# Patient Record
Sex: MALE
[De-identification: names, ages, dates, MRNs, and addresses within clinical notes are randomized per-mention and may not be internally consistent; named-entity substitution may affect disease eponyms.]

---

## 2020-11-11 DIAGNOSIS — Z01.818 ENCOUNTER FOR OTHER PREPROCEDURAL EXAMINATION: ICD-10-CM

## 2020-11-11 PROBLEM — Z00.00 ENCOUNTER FOR PREVENTIVE HEALTH EXAMINATION: Status: ACTIVE | Noted: 2020-11-11

## 2020-11-12 ENCOUNTER — APPOINTMENT (OUTPATIENT)
Dept: DISASTER EMERGENCY | Facility: CLINIC | Age: 70
End: 2020-11-12

## 2020-11-16 ENCOUNTER — OUTPATIENT (OUTPATIENT)
Dept: OUTPATIENT SERVICES | Facility: HOSPITAL | Age: 70
LOS: 1 days | Discharge: HOME | End: 2020-11-16

## 2022-12-02 ENCOUNTER — INPATIENT (INPATIENT)
Facility: HOSPITAL | Age: 72
LOS: 12 days | Discharge: HOME | DRG: 949 | End: 2022-12-15
Attending: PHYSICAL MEDICINE & REHABILITATION | Admitting: PHYSICAL MEDICINE & REHABILITATION
Payer: MEDICARE

## 2022-12-02 VITALS
WEIGHT: 181 LBS | DIASTOLIC BLOOD PRESSURE: 71 MMHG | RESPIRATION RATE: 15 BRPM | TEMPERATURE: 98 F | HEART RATE: 91 BPM | HEIGHT: 66 IN | SYSTOLIC BLOOD PRESSURE: 128 MMHG | OXYGEN SATURATION: 97 %

## 2022-12-02 DIAGNOSIS — Z98.890 OTHER SPECIFIED POSTPROCEDURAL STATES: Chronic | ICD-10-CM

## 2022-12-02 DIAGNOSIS — Z96.653 PRESENCE OF ARTIFICIAL KNEE JOINT, BILATERAL: ICD-10-CM

## 2022-12-02 RX ORDER — SENNA PLUS 8.6 MG/1
2 TABLET ORAL AT BEDTIME
Refills: 0 | Status: DISCONTINUED | OUTPATIENT
Start: 2022-12-02 | End: 2022-12-15

## 2022-12-02 RX ORDER — ASPIRIN/CALCIUM CARB/MAGNESIUM 324 MG
81 TABLET ORAL EVERY 12 HOURS
Refills: 0 | Status: DISCONTINUED | OUTPATIENT
Start: 2022-12-02 | End: 2022-12-15

## 2022-12-02 RX ORDER — ACETAMINOPHEN 500 MG
975 TABLET ORAL EVERY 8 HOURS
Refills: 0 | Status: DISCONTINUED | OUTPATIENT
Start: 2022-12-02 | End: 2022-12-15

## 2022-12-02 RX ORDER — FERROUS SULFATE 325(65) MG
325 TABLET ORAL
Refills: 0 | Status: DISCONTINUED | OUTPATIENT
Start: 2022-12-02 | End: 2022-12-05

## 2022-12-02 RX ORDER — ATORVASTATIN CALCIUM 80 MG/1
40 TABLET, FILM COATED ORAL AT BEDTIME
Refills: 0 | Status: DISCONTINUED | OUTPATIENT
Start: 2022-12-02 | End: 2022-12-15

## 2022-12-02 RX ORDER — BNT162B2 ORIGINAL AND OMICRON BA.4/BA.5 .1125; .1125 MG/2.25ML; MG/2.25ML
0.3 INJECTION, SUSPENSION INTRAMUSCULAR ONCE
Refills: 0 | Status: DISCONTINUED | OUTPATIENT
Start: 2022-12-02 | End: 2022-12-03

## 2022-12-02 RX ORDER — OXYCODONE HYDROCHLORIDE 5 MG/1
10 TABLET ORAL EVERY 4 HOURS
Refills: 0 | Status: DISCONTINUED | OUTPATIENT
Start: 2022-12-02 | End: 2022-12-08

## 2022-12-02 RX ORDER — FAMOTIDINE 10 MG/ML
20 INJECTION INTRAVENOUS
Refills: 0 | Status: DISCONTINUED | OUTPATIENT
Start: 2022-12-02 | End: 2022-12-15

## 2022-12-02 RX ORDER — LOSARTAN POTASSIUM 100 MG/1
25 TABLET, FILM COATED ORAL DAILY
Refills: 0 | Status: DISCONTINUED | OUTPATIENT
Start: 2022-12-03 | End: 2022-12-05

## 2022-12-02 RX ORDER — OXYCODONE HYDROCHLORIDE 5 MG/1
5 TABLET ORAL EVERY 4 HOURS
Refills: 0 | Status: DISCONTINUED | OUTPATIENT
Start: 2022-12-02 | End: 2022-12-08

## 2022-12-02 RX ADMIN — Medication 975 MILLIGRAM(S): at 23:20

## 2022-12-02 RX ADMIN — Medication 81 MILLIGRAM(S): at 22:50

## 2022-12-02 RX ADMIN — FAMOTIDINE 20 MILLIGRAM(S): 10 INJECTION INTRAVENOUS at 22:50

## 2022-12-02 RX ADMIN — Medication 325 MILLIGRAM(S): at 22:50

## 2022-12-02 RX ADMIN — ATORVASTATIN CALCIUM 40 MILLIGRAM(S): 80 TABLET, FILM COATED ORAL at 22:50

## 2022-12-02 RX ADMIN — SENNA PLUS 2 TABLET(S): 8.6 TABLET ORAL at 22:51

## 2022-12-02 RX ADMIN — Medication 975 MILLIGRAM(S): at 22:50

## 2022-12-02 NOTE — H&P ADULT - NSHPREVIEWOFSYSTEMS_GEN_ALL_CORE
REVIEW OF SYSTEMS  Constitutional: No fever, No Chills, No fatigue  HEENT: No eye pain, No visual disturbances, No difficulty hearing  Pulm: No cough,  No shortness of breath  Cardio: No chest pain, No palpitations  GI:  No abdominal pain, No nausea, No vomiting, No diarrhea, No constipation  : No dysuria, No frequency, No hematuria  Neuro: No headaches, No memory loss, No loss of strength, No numbness, No tremors  Skin: No itching, No rashes, No lesions   MSK: +joint pain, + joint swelling, No muscle pain, No Neck or back pain  Psych:  No depression, No anxiety

## 2022-12-02 NOTE — H&P ADULT - HISTORY OF PRESENT ILLNESS
This is a 72 year old male patient with hx of HTN, HLD, Rotator Cuff Tear (s/p Bilateral Repairs: Left 05/29/2013; Right: 08/28/2019), DDD (s/p Lumbar Laminectomy 1986) and Osteoarthritis presented to [facility] 11/30 for bilateral total knee replacement. Patient notes over 3 year history of bilateral knee pains. Of note, he states that he's had previous meniscus surgeries on both knees. The pain is described as "dull" and rates it 8/10 in intensity, and worse with stairs and inclines as well as with prolonged standing and ambulation. Conservative care tried in the past included intake of NSAIDs, cortisone injections, rest and activity modification with minimal relief of pain. XR of bilateral knees demonstrated "bone on bone" articulation. Patient underwent bilateral TKA 11/30/22 with. Patient deemed appropriate for acute inpatient rehab. Cleared for d/c to [facility] acute inpatient rehab on 12/2/22.

## 2022-12-02 NOTE — PATIENT PROFILE ADULT - FALL HARM RISK - HARM RISK INTERVENTIONS
Assistance with ambulation/Assistance OOB with selected safe patient handling equipment/Communicate Risk of Fall with Harm to all staff/Discuss with provider need for PT consult/Monitor gait and stability/Provide patient with walking aids - walker, cane, crutches/Reinforce activity limits and safety measures with patient and family/Sit up slowly, dangle for a short time, stand at bedside before walking/Tailored Fall Risk Interventions/Use of alarms - bed, chair and/or voice tab/Visual Cue: Yellow wristband and red socks/Bed in lowest position, wheels locked, appropriate side rails in place/Call bell, personal items and telephone in reach/Instruct patient to call for assistance before getting out of bed or chair/Non-slip footwear when patient is out of bed/[place] to call system/Physically safe environment - no spills, clutter or unnecessary equipment/Purposeful Proactive Rounding/Room/bathroom lighting operational, light cord in reach

## 2022-12-02 NOTE — H&P ADULT - NSHPLABSRESULTS_GEN_ALL_CORE
12/01/2022    Glucose: 127    Sodium: 137    Potassium: 4.1    Blood Urea Nitrogen : 15    Creatinine: 1   Hematology:    12/02/2022    WBC: 9.49    HgB: 9.9    Hct: 28.8    Platelets: 160   Cultures:     11/30/2022 COVID-19 PCR; Not detected   Radiology:     X-ray right knee 11/30: Postsurgical changes consistent with recent knee arthroplasty with appropriate alignment.

## 2022-12-02 NOTE — H&P ADULT - NSHPSOCIALHISTORY_GEN_ALL_CORE
SOCIAL HISTORY  Smoking -   EtOH -   Marital Status -     FUNCTIONAL HISTORY    Previous Functional Status:  Independent in ambulation, ADL's, transfers prior to hospitalization    Current Functional Status:  Bed mobility:  Transfers:  Gait / ambulation:  ADL's:  Speech: SOCIAL HISTORY  Smoking -   EtOH -   Marital Status -     FUNCTIONAL HISTORY  Patient lives with spouse in an [address] guido with rails tub/shower with curtain    Previous Functional Status:Independent with ambulation, ADLs, and transfers no AD   Independent in ambulation, ADL's, transfers prior to hospitalization    Current Functional Status:  12/1   rolling Altaf  sit/supine Altaf   bed chair modA  amb 40' RW Altaf   LBD modA  toileting Altaf SOCIAL HISTORY  Smoking - denies   EtOH -  denies  Marital Status -     FUNCTIONAL HISTORY  Patient lives with spouse in a 2nd floor apartment apt 16 steps to 2nd floor.  0 SYLVIE    Previous Functional Status: Independent with ambulation, ADLs, and transfers no AD   Independent in ambulation, ADL's, transfers prior to hospitalization    Current Functional Status:  12/1   rolling Altaf  sit/supine Altaf   bed chair modA  amb 40' RW Altaf   LBD modA  toileting Altaf

## 2022-12-02 NOTE — H&P ADULT - NSICDXPASTSURGICALHX_GEN_ALL_CORE_FT
PAST SURGICAL HISTORY:  H/O meniscectomy of right knee     H/O repair of rotator cuff left 2013, right 2019    History of lumbar laminectomy 1986    History of meniscectomy of left knee

## 2022-12-02 NOTE — H&P ADULT - ASSESSMENT
72yM with a history of HTN, HLD, OA, who presented to [facility] on 11/30 with complaint of bilateral knee pain and OA who underwent bilateral total knee arthroplasty on 11/30/22. Hospital course supposedly uncomplicated.        Admitted to [place] acute inpatient rehab on 12/2 for ADL, gait, and functional impairments.       #Functional deficits due to OA s/p Bilateral Total Knee Arthroplasty  -b/l TKA 11/30/22    - Comprehensive Multidisciplinary Rehab Program     3 hours a day, 5 days a week with PT/OT/SLP     P&O as needed    Pain Management:  - Tylenol PRN    GI/Bowel:  - Senna QHS, Miralax daily PRN  - GI ppx:    /Bladder:   - Encourage timed voids every 4 hours while awake       Skin/Pressure Injury:  - Skin assessment on admission admission: ***  - Monitor Incisions: ***  - Turn every 2 hours while in bed    Diet:   - Diet Consistency/Modifications: ***  - Aspiration Precautions  - SLP consult for swallow function evaluation and treatment    DVT ppx:  - ***  - SCDs  - Last Doppler on ***    Restrictions/Precautions:  - Weight bearing status: ****  - ROM restrictions: ***  - Precautions:    ---------------  Outpatient Follow-up:      --------------    Goals: Safe discharge to home  Estimated Length of Stay: 10-14 days  Rehab Potential: Good  Medical Prognosis: Good  Estimated Disposition: Home with Home Care  ---------------    PRESCREEN COMPARISON:  I have reviewed the prescreen information and I have found no relevant changes between the preadmission screening and my post admission evaluation.    RATIONALE FOR INPATIENT ADMISSION: Patient demonstrates the following:  [X] Medically appropriate for rehabilitation admission  [X] Has attainable rehab goals with an appropriate initial discharge plan  [X]Has rehabilitation potential (expected to make a significant improvement within a reasonable period of time)  [X] Requires close medical management by a rehab physician, rehab nursing care, Hospitalist and comprehensive interdisciplinary team (including PT, OT and/or SLP, Prosthetics and Orthotics)   72yM with a history of HTN, HLD, OA, who presented to [facility] on 11/30 with complaint of bilateral knee pain and OA who underwent bilateral total knee arthroplasty on 11/30/22. Hospital course supposedly uncomplicated.        Admitted to [place] acute inpatient rehab on 12/2 for ADL, gait, and functional impairments.       #Functional deficits due to OA s/p Bilateral Total Knee Arthroplasty  -b/l TKA 11/30/22  -pain control  -DVT ppx  - Comprehensive Multidisciplinary Rehab Program     3 hours a day, 5 days a week with PT/OT/SLP     P&O as needed    #HTN  c/w losartan    #HLD  c/w statin    Pain Management:  - Tylenol PRN    GI/Bowel:  - Senna QHS, Miralax daily   - GI ppx: pepcid    /Bladder:   - Encourage timed voids every 4 hours while awake       Skin/Pressure Injury:  - Skin assessment on admission: ***  - Monitor Incisions: ***      Diet:   - Diet Consistency/Modifications: Reg w/ thins - DASH    DVT ppx:  - ***  - Last Doppler unknown    Restrictions/Precautions:  - Weight bearing status: WBAT (confirm on d/c paperwork)  - ROM restrictions:   - Precautions: Falls    ---------------  Outpatient Follow-up:      --------------    Goals: Safe discharge to home  Estimated Length of Stay: 10-14 days  Rehab Potential: Good  Medical Prognosis: Good  Estimated Disposition: Home with Home Care  ---------------    PRESCREEN COMPARISON:  I have reviewed the prescreen information and I have found no relevant changes between the preadmission screening and my post admission evaluation.    RATIONALE FOR INPATIENT ADMISSION: Patient demonstrates the following:  [X] Medically appropriate for rehabilitation admission  [X] Has attainable rehab goals with an appropriate initial discharge plan  [X]Has rehabilitation potential (expected to make a significant improvement within a reasonable period of time)  [X] Requires close medical management by a rehab physician, rehab nursing care, Hospitalist and comprehensive interdisciplinary team (including PT, OT and/or SLP, Prosthetics and Orthotics)   72yM with a history of HTN, HLD, OA, who presented to [facility] on 11/30 with complaint of bilateral knee pain and OA who underwent bilateral total knee arthroplasty on 11/30/22. Hospital course supposedly uncomplicated.        Admitted to [place] acute inpatient rehab on 12/2 for ADL, gait, and functional impairments.       #Functional deficits due to OA s/p Bilateral Total Knee Arthroplasty  -b/l TKA 11/30/22  -pain control  -DVT ppx with ASA BID   - Comprehensive Multidisciplinary Rehab Program     3 hours a day, 5 days a week with PT/OT/SLP     P&O as needed    #HTN  c/w losartan    #HLD  c/w statin    Pain Management:  - Tylenol PRN    GI/Bowel:  - Senna QHS, Miralax daily   - GI ppx: pepcid    /Bladder:   - Encourage timed voids every 4 hours while awake       Skin/Pressure Injury:  - Skin assessment on admission: ***  - Monitor Incisions: ***      Diet:   - Diet Consistency/Modifications: Reg w/ thins - DASH    DVT ppx:  - ***  - Last Doppler unknown    Restrictions/Precautions:  - Weight bearing status: WBAT (confirm on d/c paperwork)  - ROM restrictions:   - Precautions: Falls    ---------------  Outpatient Follow-up:      --------------    Goals: Safe discharge to home  Estimated Length of Stay: 10-14 days  Rehab Potential: Good  Medical Prognosis: Good  Estimated Disposition: Home with Home Care  ---------------    PRESCREEN COMPARISON:  I have reviewed the prescreen information and I have found no relevant changes between the preadmission screening and my post admission evaluation.    RATIONALE FOR INPATIENT ADMISSION: Patient demonstrates the following:  [X] Medically appropriate for rehabilitation admission  [X] Has attainable rehab goals with an appropriate initial discharge plan  [X]Has rehabilitation potential (expected to make a significant improvement within a reasonable period of time)  [X] Requires close medical management by a rehab physician, rehab nursing care, Hospitalist and comprehensive interdisciplinary team (including PT, OT and/or SLP, Prosthetics and Orthotics)   72yM with a history of HTN, HLD, OA, who presented to [facility] on 11/30 with complaint of bilateral knee pain and OA who underwent bilateral total knee arthroplasty on 11/30/22. Hospital course supposedly uncomplicated.        Admitted to [place] acute inpatient rehab on 12/2 for ADL, gait, and functional impairments.       #Functional deficits due to OA s/p Bilateral Total Knee Arthroplasty  -b/l TKA 11/30/22  -pain control  -DVT ppx with ASA BID for 6 weeks (till 1/11)  - Comprehensive Multidisciplinary Rehab Program     3 hours a day, 5 days a week with PT/OT/SLP     P&O as needed    #HTN  c/w home dose losartan    #HLD  c/w equivalent home dose statin (at home on on Crestor 10mg)    Pain Management:  - Tylenol PRN  -oxycodone PRN    GI/Bowel:  - Senna QHS, Miralax daily   - GI ppx: pepcid    /Bladder:   - Encourage timed voids every 4 hours while awake       Skin/Pressure Injury:  - Skin assessment on admission: intact  - Monitor Incisions: Bilateral knee surgical incision with aquacel to be removed 7 days       Diet:   - Diet Consistency/Modifications: Reg w/ thins - DASH    DVT ppx:  - ***  - Last Doppler unknown    Restrictions/Precautions:  - Weight bearing status: WBAT (confirm on d/c paperwork)  - ROM restrictions:   - Precautions: Falls    ---------------  Outpatient Follow-up:      --------------    Goals: Safe discharge to home  Estimated Length of Stay: 10-14 days  Rehab Potential: Good  Medical Prognosis: Good  Estimated Disposition: Home with Home Care  ---------------    PRESCREEN COMPARISON:  I have reviewed the prescreen information and I have found no relevant changes between the preadmission screening and my post admission evaluation.    RATIONALE FOR INPATIENT ADMISSION: Patient demonstrates the following:  [X] Medically appropriate for rehabilitation admission  [X] Has attainable rehab goals with an appropriate initial discharge plan  [X]Has rehabilitation potential (expected to make a significant improvement within a reasonable period of time)  [X] Requires close medical management by a rehab physician, rehab nursing care, Hospitalist and comprehensive interdisciplinary team (including PT, OT and/or SLP, Prosthetics and Orthotics)   72yM with a history of HTN, HLD, OA, who presented to [facility] on 11/30 with complaint of bilateral knee pain and OA who underwent bilateral total knee arthroplasty on 11/30/22. Hospital course supposedly uncomplicated.        Admitted to [place] acute inpatient rehab on 12/2 for ADL, gait, and functional impairments.       #Functional deficits due to OA s/p Bilateral Total Knee Arthroplasty  -b/l TKA 11/30/22  -pain control  -DVT ppx with ASA BID for 6 weeks (till 1/11)  - Comprehensive Multidisciplinary Rehab Program     3 hours a day, 5 days a week with PT/OT/SLP     P&O as needed    #HTN  c/w home dose losartan    #HLD  c/w equivalent home dose statin (at home on on Crestor 10mg)    Pain Management:  - Tylenol PRN  -oxycodone PRN  -Ice PRN    GI/Bowel:  - Senna QHS, Miralax daily   - GI ppx: pepcid    /Bladder:   - Encourage timed voids every 4 hours while awake       Skin/Pressure Injury:  - Skin assessment on admission: intact  - Monitor Incisions: Bilateral knee surgical incision with aquacel to be removed 7 days post op (12/7)  Diet:   - Diet Consistency/Modifications: Reg w/ thins - DASH    DVT ppx:  - ASA BID      Restrictions/Precautions:  - Weight bearing status: WBAT  - Precautions: Falls    ---------------  Outpatient Follow-up:    Dr. Jose Angel Marrero  Internal Medicine  [address]  [address]  [place] [address]  [phone]    Dr. Dick Morelos  Orthopedic Surgery  [address]  [place] [address]  [phone]  Follow-up in 3 weeks    --------------    Goals: Safe discharge to home  Estimated Length of Stay: 10-14 days  Rehab Potential: Good  Medical Prognosis: Good  Estimated Disposition: Home with Home Care  ---------------    PRESCREEN COMPARISON:  I have reviewed the prescreen information and I have found no relevant changes between the preadmission screening and my post admission evaluation.    RATIONALE FOR INPATIENT ADMISSION: Patient demonstrates the following:  [X] Medically appropriate for rehabilitation admission  [X] Has attainable rehab goals with an appropriate initial discharge plan  [X]Has rehabilitation potential (expected to make a significant improvement within a reasonable period of time)  [X] Requires close medical management by a rehab physician, rehab nursing care, Hospitalist and comprehensive interdisciplinary team (including PT, OT and/or SLP, Prosthetics and Orthotics)

## 2022-12-02 NOTE — H&P ADULT - NSHPPHYSICALEXAM_GEN_ALL_CORE
Constitutional - NAD, Comfortable  HEENT - NCAT, EOMI  Neck - Supple, No limited ROM  Chest - good chest expansion, good respiratory effort, CTAB   Cardio - warm and well perfused, RRR, no murmur  Abdomen -  Soft, NTND  Extremities - + swelling bilateral knees, No calf tenderness   Neurologic Exam:                    Cognitive -             Orientation: Awake, Alert, AAO to self, place, date, year, situation            Attention:  Days of week, recall 3 objects without cuing            Memory: Recent/ remote memory intact            Thought: process, content appropriate     Speech - Fluent, Comprehensible, No dysarthria, No aphasia      Cranial Nerves - No facial asymmetry, Tongue midline, EOMI, Shoulder shrug intact     Motor -                      LEFT    UE - ShAB 5/5, EF 5/5, EE 5/5, WE 5/5,  WNL                    RIGHT UE - ShAB 5/5, EF 5/5, EE 5/5, WE 5/5,  WNL                    LEFT    LE - HF 5/5, KE not tested due to pain, DF 5/5, PF 5/5                    RIGHT LE - HF 5/5, KE not tested due to pain, DF 5/5, PF 5/5        Sensory - Intact to LT bilateral     Reflexes - DTR 2/ 4 , neg Norman's b/l, neg Babinski's b/l     Coordination - FTN intact/ HTS limited due to pain     OculoVestibular -  No nystagmus  Psychiatric - Mood stable, Affect WNL  Skin on admission: BL surgical incision of the bilateral knees with aquacel dressing

## 2022-12-03 LAB
ALBUMIN SERPL ELPH-MCNC: 2.7 G/DL — LOW (ref 3.3–5)
ALP SERPL-CCNC: 51 U/L — SIGNIFICANT CHANGE UP (ref 40–120)
ALT FLD-CCNC: 12 U/L — SIGNIFICANT CHANGE UP (ref 10–45)
ANION GAP SERPL CALC-SCNC: 10 MMOL/L — SIGNIFICANT CHANGE UP (ref 5–17)
AST SERPL-CCNC: 20 U/L — SIGNIFICANT CHANGE UP (ref 10–40)
BASOPHILS # BLD AUTO: 0.02 K/UL — SIGNIFICANT CHANGE UP (ref 0–0.2)
BASOPHILS NFR BLD AUTO: 0.2 % — SIGNIFICANT CHANGE UP (ref 0–2)
BILIRUB SERPL-MCNC: 0.7 MG/DL — SIGNIFICANT CHANGE UP (ref 0.2–1.2)
BUN SERPL-MCNC: 19 MG/DL — SIGNIFICANT CHANGE UP (ref 7–23)
CALCIUM SERPL-MCNC: 8.2 MG/DL — LOW (ref 8.4–10.5)
CHLORIDE SERPL-SCNC: 102 MMOL/L — SIGNIFICANT CHANGE UP (ref 96–108)
CO2 SERPL-SCNC: 25 MMOL/L — SIGNIFICANT CHANGE UP (ref 22–31)
CREAT SERPL-MCNC: 1.05 MG/DL — SIGNIFICANT CHANGE UP (ref 0.5–1.3)
EGFR: 76 ML/MIN/1.73M2 — SIGNIFICANT CHANGE UP
EOSINOPHIL # BLD AUTO: 0.01 K/UL — SIGNIFICANT CHANGE UP (ref 0–0.5)
EOSINOPHIL NFR BLD AUTO: 0.1 % — SIGNIFICANT CHANGE UP (ref 0–6)
GLUCOSE SERPL-MCNC: 105 MG/DL — HIGH (ref 70–99)
HCT VFR BLD CALC: 26.3 % — LOW (ref 39–50)
HCV AB S/CO SERPL IA: 0.05 S/CO — SIGNIFICANT CHANGE UP (ref 0–0.99)
HCV AB SERPL-IMP: SIGNIFICANT CHANGE UP
HGB BLD-MCNC: 9 G/DL — LOW (ref 13–17)
IMM GRANULOCYTES NFR BLD AUTO: 0.6 % — SIGNIFICANT CHANGE UP (ref 0–0.9)
LYMPHOCYTES # BLD AUTO: 1.33 K/UL — SIGNIFICANT CHANGE UP (ref 1–3.3)
LYMPHOCYTES # BLD AUTO: 15.2 % — SIGNIFICANT CHANGE UP (ref 13–44)
MCHC RBC-ENTMCNC: 29.9 PG — SIGNIFICANT CHANGE UP (ref 27–34)
MCHC RBC-ENTMCNC: 34.2 GM/DL — SIGNIFICANT CHANGE UP (ref 32–36)
MCV RBC AUTO: 87.4 FL — SIGNIFICANT CHANGE UP (ref 80–100)
MONOCYTES # BLD AUTO: 1 K/UL — HIGH (ref 0–0.9)
MONOCYTES NFR BLD AUTO: 11.4 % — SIGNIFICANT CHANGE UP (ref 2–14)
NEUTROPHILS # BLD AUTO: 6.34 K/UL — SIGNIFICANT CHANGE UP (ref 1.8–7.4)
NEUTROPHILS NFR BLD AUTO: 72.5 % — SIGNIFICANT CHANGE UP (ref 43–77)
NRBC # BLD: 0 /100 WBCS — SIGNIFICANT CHANGE UP (ref 0–0)
PLATELET # BLD AUTO: 152 K/UL — SIGNIFICANT CHANGE UP (ref 150–400)
POTASSIUM SERPL-MCNC: 3.4 MMOL/L — LOW (ref 3.5–5.3)
POTASSIUM SERPL-SCNC: 3.4 MMOL/L — LOW (ref 3.5–5.3)
PROT SERPL-MCNC: 6.1 G/DL — SIGNIFICANT CHANGE UP (ref 6–8.3)
RBC # BLD: 3.01 M/UL — LOW (ref 4.2–5.8)
RBC # FLD: 12.7 % — SIGNIFICANT CHANGE UP (ref 10.3–14.5)
SARS-COV-2 RNA SPEC QL NAA+PROBE: SIGNIFICANT CHANGE UP
SODIUM SERPL-SCNC: 137 MMOL/L — SIGNIFICANT CHANGE UP (ref 135–145)
WBC # BLD: 8.75 K/UL — SIGNIFICANT CHANGE UP (ref 3.8–10.5)
WBC # FLD AUTO: 8.75 K/UL — SIGNIFICANT CHANGE UP (ref 3.8–10.5)

## 2022-12-03 PROCEDURE — 99232 SBSQ HOSP IP/OBS MODERATE 35: CPT

## 2022-12-03 PROCEDURE — 99223 1ST HOSP IP/OBS HIGH 75: CPT

## 2022-12-03 RX ORDER — BNT162B2 ORIGINAL AND OMICRON BA.4/BA.5 .1125; .1125 MG/2.25ML; MG/2.25ML
0.3 INJECTION, SUSPENSION INTRAMUSCULAR ONCE
Refills: 0 | Status: DISCONTINUED | OUTPATIENT
Start: 2022-12-05 | End: 2022-12-06

## 2022-12-03 RX ORDER — POTASSIUM CHLORIDE 20 MEQ
20 PACKET (EA) ORAL ONCE
Refills: 0 | Status: DISCONTINUED | OUTPATIENT
Start: 2022-12-03 | End: 2022-12-03

## 2022-12-03 RX ORDER — POLYETHYLENE GLYCOL 3350 17 G/17G
17 POWDER, FOR SOLUTION ORAL DAILY
Refills: 0 | Status: DISCONTINUED | OUTPATIENT
Start: 2022-12-03 | End: 2022-12-15

## 2022-12-03 RX ORDER — POTASSIUM CHLORIDE 20 MEQ
40 PACKET (EA) ORAL EVERY 4 HOURS
Refills: 0 | Status: COMPLETED | OUTPATIENT
Start: 2022-12-03 | End: 2022-12-03

## 2022-12-03 RX ORDER — SODIUM CHLORIDE 9 MG/ML
500 INJECTION INTRAMUSCULAR; INTRAVENOUS; SUBCUTANEOUS ONCE
Refills: 0 | Status: COMPLETED | OUTPATIENT
Start: 2022-12-03 | End: 2022-12-03

## 2022-12-03 RX ADMIN — ATORVASTATIN CALCIUM 40 MILLIGRAM(S): 80 TABLET, FILM COATED ORAL at 21:15

## 2022-12-03 RX ADMIN — Medication 325 MILLIGRAM(S): at 17:19

## 2022-12-03 RX ADMIN — FAMOTIDINE 20 MILLIGRAM(S): 10 INJECTION INTRAVENOUS at 17:19

## 2022-12-03 RX ADMIN — Medication 325 MILLIGRAM(S): at 05:33

## 2022-12-03 RX ADMIN — OXYCODONE HYDROCHLORIDE 5 MILLIGRAM(S): 5 TABLET ORAL at 23:52

## 2022-12-03 RX ADMIN — SENNA PLUS 2 TABLET(S): 8.6 TABLET ORAL at 21:15

## 2022-12-03 RX ADMIN — OXYCODONE HYDROCHLORIDE 10 MILLIGRAM(S): 5 TABLET ORAL at 15:42

## 2022-12-03 RX ADMIN — OXYCODONE HYDROCHLORIDE 10 MILLIGRAM(S): 5 TABLET ORAL at 11:02

## 2022-12-03 RX ADMIN — Medication 40 MILLIEQUIVALENT(S): at 11:01

## 2022-12-03 RX ADMIN — OXYCODONE HYDROCHLORIDE 10 MILLIGRAM(S): 5 TABLET ORAL at 10:24

## 2022-12-03 RX ADMIN — OXYCODONE HYDROCHLORIDE 5 MILLIGRAM(S): 5 TABLET ORAL at 22:52

## 2022-12-03 RX ADMIN — Medication 40 MILLIEQUIVALENT(S): at 13:09

## 2022-12-03 RX ADMIN — Medication 81 MILLIGRAM(S): at 05:33

## 2022-12-03 RX ADMIN — FAMOTIDINE 20 MILLIGRAM(S): 10 INJECTION INTRAVENOUS at 05:33

## 2022-12-03 RX ADMIN — LOSARTAN POTASSIUM 25 MILLIGRAM(S): 100 TABLET, FILM COATED ORAL at 05:33

## 2022-12-03 RX ADMIN — Medication 81 MILLIGRAM(S): at 17:19

## 2022-12-03 NOTE — PROGRESS NOTE ADULT - SUBJECTIVE AND OBJECTIVE BOX
Cc: bilateral total knee arthroplasty on 11/30/22 with gait difficulties    HPI: Patient with no new medical issues today. Patient notes he is very happy with his care since being admitted to inpatient rehab.  Pain controlled, no chest pain, no N/V, no Fevers/Chills. No other new ROS  Has been tolerating rehabilitation program.    acetaminophen     Tablet .. 975 milliGRAM(s) Oral every 8 hours PRN  aspirin  chewable 81 milliGRAM(s) Oral every 12 hours  atorvastatin 40 milliGRAM(s) Oral at bedtime  famotidine    Tablet 20 milliGRAM(s) Oral two times a day  ferrous    sulfate 325 milliGRAM(s) Oral two times a day  losartan 25 milliGRAM(s) Oral daily  oxyCODONE    IR 10 milliGRAM(s) Oral every 4 hours PRN  oxyCODONE    IR 5 milliGRAM(s) Oral every 4 hours PRN  potassium chloride   Powder 40 milliEquivalent(s) Oral every 4 hours  senna 2 Tablet(s) Oral at bedtime                          9.0    8.75  )-----------( 152      ( 03 Dec 2022 06:00 )             26.3     12-03    137  |  102  |  19  ----------------------------<  105<H>  3.4<L>   |  25  |  1.05    Ca    8.2<L>      03 Dec 2022 06:00    TPro  6.1  /  Alb  2.7<L>  /  TBili  0.7  /  DBili  x   /  AST  20  /  ALT  12  /  AlkPhos  51  12-03    T(C): 36.7 (12-03-22 @ 08:14), Max: 36.8 (12-02-22 @ 22:01)  HR: 88 (12-03-22 @ 08:14) (82 - 91)  BP: 112/60 (12-03-22 @ 08:14) (111/68 - 128/71)  RR: 16 (12-03-22 @ 08:14) (15 - 16)  SpO2: 100% (12-03-22 @ 08:14) (97% - 100%)    In NAD  HEENT- EOMI  Heart- Well Perfused  Lungs- No resp distress, no use of accessory resp muscles  Abd- soft, NT  Ext- No calf pain; bilateral TKR with dressings in place without any erythema, drainage or effusion  Neuro- Exam unchanged  Psych- Affect wnl      Imp: Patient with diagnosis of    bilateral total knee arthroplasty on 11/30/22  admitted for comprehensive acute rehabilitation.    Plan:  - Continue therapies  - DVT prophylaxis  - Skin- Turn q2h, check skin daily  - Continue current medications; patient medically stable.   - Patient is stable to continue current rehabilitation program  - Admission labs reviewed:  - Anemia - monitor Hgb levels - medicine on board  - Hypokalemia - monitor K levels - medicine on board    From patient's admission note:  72yM with a history of HTN, HLD, OA, who presented to [facility] on 11/30 with complaint of bilateral knee pain and OA who underwent bilateral total knee arthroplasty on 11/30/22. Hospital course supposedly uncomplicated. Admitted to [place] acute inpatient rehab on 12/2 for ADL, gait, and functional impairments.       #Functional deficits due to OA s/p Bilateral Total Knee Arthroplasty  -b/l TKA 11/30/22  -pain control  -DVT ppx with ASA BID for 6 weeks (till 1/11)  - Comprehensive Multidisciplinary Rehab Program     3 hours a day, 5 days a week with PT/OT/SLP     P&O as needed    #HTN  c/w home dose losartan    #HLD  c/w equivalent home dose statin (at home on on Crestor 10mg)    Pain Management:  - Tylenol PRN  -oxycodone PRN  -Ice PRN    GI/Bowel:  - Senna QHS, Miralax daily   - GI ppx: pepcid    /Bladder:   - Encourage timed voids every 4 hours while awake       Skin/Pressure Injury:  - Skin assessment on admission: intact  - Monitor Incisions: Bilateral knee surgical incision with aquacel to be removed 7 days post op (12/7)  Diet:   - Diet Consistency/Modifications: Reg w/ thins - DASH    DVT ppx:  - ASA BID      Restrictions/Precautions:  - Weight bearing status: WBAT  - Precautions: Falls    ---------------  Outpatient Follow-up:    Dr. Jose Angel Marrero  Internal Medicine  [address]  [address]  [place], [place] [id number] [phone]    Dr. Dick Morelos  Orthopedic Surgery  [address]  [address]  [place] [address]  [phone]  Follow-up in 3 weeks

## 2022-12-03 NOTE — CONSULT NOTE ADULT - ASSESSMENT
HPI:  This is a 72 year old male patient with hx of HTN, HLD, Rotator Cuff Tear (s/p Bilateral Repairs: Left 05/29/2013; Right: 08/28/2019), DDD (s/p Lumbar Laminectomy 1986) and Osteoarthritis presented to [facility] 11/30 for bilateral total knee replacement. Patient notes over 3 year history of bilateral knee pains. Of note, he states that he's had previous meniscus surgeries on both knees. The pain is described as "dull" and rates it 8/10 in intensity, and worse with stairs and inclines as well as with prolonged standing and ambulation. Conservative care tried in the past included intake of NSAIDs, cortisone injections, rest and activity modification with minimal relief of pain. XR of bilateral knees demonstrated "bone on bone" articulation. Patient underwent bilateral TKA 11/30/22 with. Patient deemed appropriate for acute inpatient rehab. Cleared for d/c to [facility] acute inpatient rehab on 12/2/22.  (02 Dec 2022 14:44)       73 yo man with history of HTN, HLD, Rotator Cuff Tear (s/p Bilateral Repairs: Left 05/29/2013; Right: 08/28/2019), DDD (s/p Lumbar Laminectomy 1986) and Osteoarthritis admitted to  after hospital course at [facility] for bilateral total knee replacement.     Physical Debility due to B/L TKR  - Comprehensive rehab as per primary team  - Bowel/Pain regimen as per primary team  - Continue ASA BID with famotidine for GI Prophylaxis     Hyperlipidemia   - Continue atorvastatin    Normocytic Anemia likely MARTIN and post op anemia  - Continue iron supplements BID    Hypertension  - Continue losartan  - Monitor vital signs    Hypokalemia  - Repleted with PO potassium    DVT Prophylaxis with aspirin twice a day

## 2022-12-03 NOTE — CONSULT NOTE ADULT - SUBJECTIVE AND OBJECTIVE BOX
Patient is a 72y old  Male who presents with a chief complaint of OA s/p B/L TKA (02 Dec 2022 14:44)      HPI:  This is a 72 year old male patient with hx of HTN, HLD, Rotator Cuff Tear (s/p Bilateral Repairs: Left 05/29/2013; Right: 08/28/2019), DDD (s/p Lumbar Laminectomy 1986) and Osteoarthritis presented to [facility] 11/30 for bilateral total knee replacement. Patient notes over 3 year history of bilateral knee pains. Of note, he states that he's had previous meniscus surgeries on both knees. The pain is described as "dull" and rates it 8/10 in intensity, and worse with stairs and inclines as well as with prolonged standing and ambulation. Conservative care tried in the past included intake of NSAIDs, cortisone injections, rest and activity modification with minimal relief of pain. XR of bilateral knees demonstrated "bone on bone" articulation. Patient underwent bilateral TKA 11/30/22 with. Patient deemed appropriate for acute inpatient rehab. Cleared for d/c to [facility] acute inpatient rehab on 12/2/22.  (02 Dec 2022 14:44)      Four HPI elements (location quality, severity, duration, timing, context, modifying factors, assoicated sign and symtpoms) OR the status of three chronic or inactive problems    TODAY:  Patient seen and examined in [facility]  No overnight event    PAST MEDICAL & SURGICAL HISTORY:  HTN (hypertension)      HLD (hyperlipidemia)      Osteoarthritis      History of lumbar laminectomy  1986      H/O repair of rotator cuff  left 2013, right 2019      History of meniscectomy of left knee      H/O meniscectomy of right knee          Father: - at age - with history of   Mother: - at age - with history of           Substance Use (street drugs): (  ) never used  (  ) other:  Tobacco Usage:  (   ) never smoked   (   ) former smoker   (   ) current smoker  (     ) pack year  Alcohol Usage:  Sexual History:   Recent Travel:      ALLERGIES:  Allergies    No Known Allergies    Intolerances        acetaminophen     Tablet .. 975 milliGRAM(s) Oral every 8 hours PRN  aspirin  chewable 81 milliGRAM(s) Oral every 12 hours  atorvastatin 40 milliGRAM(s) Oral at bedtime  coronavirus bivalent (EUA) Booster Vaccine (PFIZER) 0.3 milliLiter(s) IntraMuscular once  famotidine    Tablet 20 milliGRAM(s) Oral two times a day  ferrous    sulfate 325 milliGRAM(s) Oral two times a day  losartan 25 milliGRAM(s) Oral daily  oxyCODONE    IR 10 milliGRAM(s) Oral every 4 hours PRN  oxyCODONE    IR 5 milliGRAM(s) Oral every 4 hours PRN  senna 2 Tablet(s) Oral at bedtime      REVIEW OF SYSTEMS:  CONSTITUTIONAL: No fever, weight loss, or fatigue  EYES: No eye pain, visual disturbances, or discharge  RESPIRATORY: No cough, wheezing, chills or hemoptysis; No shortness of breath  CARDIOVASCULAR: No chest pain, palpitations, dizziness, or leg swelling  GASTROINTESTINAL: No abdominal or epigastric pain. No nausea, vomiting, or hematemesis; No diarrhea or constipation. No melena or hematochezia.  GENITOURINARY: No dysuria, frequency, hematuria, or incontinence  NEUROLOGICAL: No headaches, memory loss, loss of strength, numbness, or tremors  SKIN: No itching, burning, rashes, or lesions   MUSCULOSKELETAL: No joint pain or swelling; No muscle, back, or extremity pain  PSYCHIATRIC: No depression, anxiety, mood swings, or difficulty sleeping    ALL OTHER SYSTEMS REVIEWED AND ARE NEGATIVE    VITAL SIGNS:  T(C): 36.8 (12-02-22 @ 22:01), Max: 36.8 (12-02-22 @ 22:01)  HR: 82 (12-03-22 @ 05:31) (82 - 91)  BP: 111/68 (12-03-22 @ 05:31) (111/68 - 128/71)  RR: 15 (12-02-22 @ 22:01) (15 - 15)  SpO2: 97% (12-02-22 @ 22:01) (97% - 97%)  Wt(kg): --Vital Signs Last 24 Hrs  T(C): 36.8 (02 Dec 2022 22:01), Max: 36.8 (02 Dec 2022 22:01)  T(F): 98.3 (02 Dec 2022 22:01), Max: 98.3 (02 Dec 2022 22:01)  HR: 82 (03 Dec 2022 05:31) (82 - 91)  BP: 111/68 (03 Dec 2022 05:31) (111/68 - 128/71)  BP(mean): --  RR: 15 (02 Dec 2022 22:01) (15 - 15)  SpO2: 97% (02 Dec 2022 22:01) (97% - 97%)        PHYSICAL EXAM:  GENERAL: NAD  HENT:  Atraumatic, Normocephalic; No tonsillar erythema, exudates, ulcers, or enlargement in oropharynx; Moist mucous membranes  EYES: EOMI, PERRLA, conjunctiva and sclera clear; no lid-lag  NECK: Supple, No JVD, Normal thyroid  NERVOUS SYSTEM:  Motor Strength 5/5 B/L upper and lower extremities; CN II-XII intact  CHEST/LUNG: Clear to percussion bilaterally; No rales, rhonchi, wheezing, or rubs; normal respiratory effort, no intercostal retractions  HEART: Regular rate and rhythm; No murmurs, rubs, or gallops; No peripheral edema  ABDOMEN: Soft, Nontender, Nondistended; Bowel sounds present; No HSM  EXTREMITIES:  2+ Peripheral Pulses, No clubbing, cyanosis  SKIN: No rashes or lesions; normal temperature and turgor   PSYCH: Appropriate affect; Alert & Oriented x 3; Good judgement/insight    LABS:                        9.0    8.75  )-----------( 152      ( 03 Dec 2022 06:00 )             26.3                CAPILLARY BLOOD GLUCOSE                  Previous Consultant(s) Notes Reviewed:  YES  Care Discussed with Consultants/Other Providers YES  Previous Imaging Personally Reviewed:  YES Patient is a 72y old  Male who presents with a chief complaint of OA s/p B/L TKA (02 Dec 2022 14:44)    HPI:  This is a 72 year old male patient with hx of HTN, HLD, Rotator Cuff Tear (s/p Bilateral Repairs: Left 05/29/2013; Right: 08/28/2019), DDD (s/p Lumbar Laminectomy 1986) and Osteoarthritis presented to [facility] 11/30 for bilateral total knee replacement. Patient notes over 3 year history of bilateral knee pains. Of note, he states that he's had previous meniscus surgeries on both knees. The pain is described as "dull" and rates it 8/10 in intensity, and worse with stairs and inclines as well as with prolonged standing and ambulation. Conservative care tried in the past included intake of NSAIDs, cortisone injections, rest and activity modification with minimal relief of pain. XR of bilateral knees demonstrated "bone on bone" articulation. Patient underwent bilateral TKA 11/30/22 with. Patient deemed appropriate for acute inpatient rehab. Cleared for d/c to [facility] acute inpatient rehab on 12/2/22.  (02 Dec 2022 14:44)    TODAY:  Patient seen and examined in [facility]  No overnight event  Has pain in both knees about 8/10 after therapy this AM    PAST MEDICAL & SURGICAL HISTORY:  HTN (hypertension)  HLD (hyperlipidemia)  Osteoarthritis    History of lumbar laminectomy: 1986  H/O repair of rotator cuff: left 2013, right 2019  History of meniscectomy of left knee  H/O meniscectomy of right knee    FAMILY HISTORY:  Mother: - Breast cancer    SOCIAL HISTORY:  Substance Use (street drugs): denies  Tobacco Usage:  denies cigarette use but smokes cigars  Alcohol Usage: drinks socially    ALLERGIES:  No Known Allergies or Intolerances    MEDICATIONS  (STANDING):  aspirin  chewable 81 milliGRAM(s) Oral every 12 hours  atorvastatin 40 milliGRAM(s) Oral at bedtime  famotidine    Tablet 20 milliGRAM(s) Oral two times a day  ferrous    sulfate 325 milliGRAM(s) Oral two times a day  losartan 25 milliGRAM(s) Oral daily  potassium chloride   Powder 40 milliEquivalent(s) Oral every 4 hours  senna 2 Tablet(s) Oral at bedtime    MEDICATIONS  (PRN):  acetaminophen     Tablet .. 975 milliGRAM(s) Oral every 8 hours PRN Temp greater or equal to 38C (100.4F), Mild Pain (1 - 3)  oxyCODONE    IR 10 milliGRAM(s) Oral every 4 hours PRN Severe Pain (7 - 10)  oxyCODONE    IR 5 milliGRAM(s) Oral every 4 hours PRN Moderate Pain (4 - 6)    REVIEW OF SYSTEMS:  CONSTITUTIONAL: No fever, weight loss, or fatigue  EYES: No eye pain, visual disturbances, or discharge  RESPIRATORY: No cough, wheezing, chills or hemoptysis; No shortness of breath  CARDIOVASCULAR: No chest pain, palpitations, dizziness, or leg swelling  GASTROINTESTINAL: No abdominal or epigastric pain. No nausea, vomiting, or hematemesis; No diarrhea or constipation. No melena or hematochezia.  GENITOURINARY: No dysuria, frequency, hematuria, or incontinence  NEUROLOGICAL: No headaches, memory loss, loss of strength, numbness, or tremors  SKIN: No itching, burning, rashes, or lesions   MUSCULOSKELETAL: + B/L knee pain  PSYCHIATRIC: No depression, anxiety, mood swings, or difficulty sleeping    ALL OTHER SYSTEMS REVIEWED AND ARE NEGATIVE    VITAL SIGNS:  T(C): 36.8 (12-02-22 @ 22:01), Max: 36.8 (12-02-22 @ 22:01)  HR: 82 (12-03-22 @ 05:31) (82 - 91)  BP: 111/68 (12-03-22 @ 05:31) (111/68 - 128/71)  RR: 15 (12-02-22 @ 22:01) (15 - 15)  SpO2: 97% (12-02-22 @ 22:01) (97% - 97%)  Wt(kg): --Vital Signs Last 24 Hrs  T(C): 36.8 (02 Dec 2022 22:01), Max: 36.8 (02 Dec 2022 22:01)  T(F): 98.3 (02 Dec 2022 22:01), Max: 98.3 (02 Dec 2022 22:01)  HR: 82 (03 Dec 2022 05:31) (82 - 91)  BP: 111/68 (03 Dec 2022 05:31) (111/68 - 128/71)  BP(mean): --  RR: 15 (02 Dec 2022 22:01) (15 - 15)  SpO2: 97% (02 Dec 2022 22:01) (97% - 97%)    PHYSICAL EXAM:  GENERAL: NAD  HENT:  Atraumatic, Normocephalic; No tonsillar erythema, exudates, ulcers, or enlargement in oropharynx; Moist mucous membranes  EYES: EOMI, PERRLA, conjunctiva and sclera clear; no lid-lag  NECK: Supple, No JVD, Normal thyroid  NERVOUS SYSTEM:  Motor Strength 5/5 B/L upper and lower extremities; CN II-XII intact  CHEST/LUNG: Clear to percussion bilaterally; No rales, rhonchi, wheezing, or rubs; normal respiratory effort, no intercostal retractions  HEART: Regular rate and rhythm; No murmurs, rubs, or gallops; No peripheral edema  ABDOMEN: Soft, Nontender, Nondistended; Bowel sounds present; No HSM  EXTREMITIES:  2+ Peripheral Pulses, No clubbing, cyanosis; dressing to B/L knees  SKIN: No rashes or lesions; normal temperature and turgor   PSYCH: Appropriate affect; Alert & Oriented x 3; Good judgement/insight    LABS:                        9.0    8.75  )-----------( 152      ( 03 Dec 2022 06:00 )             26.3     Care Discussed with Consultants/Other Providers YES

## 2022-12-04 LAB
ALBUMIN SERPL ELPH-MCNC: 2.6 G/DL — LOW (ref 3.3–5)
ALP SERPL-CCNC: 54 U/L — SIGNIFICANT CHANGE UP (ref 40–120)
ALT FLD-CCNC: 12 U/L — SIGNIFICANT CHANGE UP (ref 10–45)
ANION GAP SERPL CALC-SCNC: 10 MMOL/L — SIGNIFICANT CHANGE UP (ref 5–17)
ANION GAP SERPL CALC-SCNC: 7 MMOL/L — SIGNIFICANT CHANGE UP (ref 5–17)
ANION GAP SERPL CALC-SCNC: 7 MMOL/L — SIGNIFICANT CHANGE UP (ref 5–17)
APPEARANCE UR: CLEAR — SIGNIFICANT CHANGE UP
AST SERPL-CCNC: 22 U/L — SIGNIFICANT CHANGE UP (ref 10–40)
BASOPHILS # BLD AUTO: 0.02 K/UL — SIGNIFICANT CHANGE UP (ref 0–0.2)
BASOPHILS NFR BLD AUTO: 0.2 % — SIGNIFICANT CHANGE UP (ref 0–2)
BILIRUB SERPL-MCNC: 0.7 MG/DL — SIGNIFICANT CHANGE UP (ref 0.2–1.2)
BILIRUB UR-MCNC: NEGATIVE — SIGNIFICANT CHANGE UP
BUN SERPL-MCNC: 18 MG/DL — SIGNIFICANT CHANGE UP (ref 7–23)
BUN SERPL-MCNC: 19 MG/DL — SIGNIFICANT CHANGE UP (ref 7–23)
BUN SERPL-MCNC: 19 MG/DL — SIGNIFICANT CHANGE UP (ref 7–23)
CALCIUM SERPL-MCNC: 8.1 MG/DL — LOW (ref 8.4–10.5)
CALCIUM SERPL-MCNC: 8.2 MG/DL — LOW (ref 8.4–10.5)
CALCIUM SERPL-MCNC: 8.3 MG/DL — LOW (ref 8.4–10.5)
CHLORIDE SERPL-SCNC: 100 MMOL/L — SIGNIFICANT CHANGE UP (ref 96–108)
CHLORIDE SERPL-SCNC: 96 MMOL/L — SIGNIFICANT CHANGE UP (ref 96–108)
CHLORIDE SERPL-SCNC: 97 MMOL/L — SIGNIFICANT CHANGE UP (ref 96–108)
CO2 SERPL-SCNC: 24 MMOL/L — SIGNIFICANT CHANGE UP (ref 22–31)
CO2 SERPL-SCNC: 26 MMOL/L — SIGNIFICANT CHANGE UP (ref 22–31)
CO2 SERPL-SCNC: 27 MMOL/L — SIGNIFICANT CHANGE UP (ref 22–31)
COLOR SPEC: YELLOW — SIGNIFICANT CHANGE UP
CREAT SERPL-MCNC: 0.97 MG/DL — SIGNIFICANT CHANGE UP (ref 0.5–1.3)
CREAT SERPL-MCNC: 1.06 MG/DL — SIGNIFICANT CHANGE UP (ref 0.5–1.3)
CREAT SERPL-MCNC: 1.08 MG/DL — SIGNIFICANT CHANGE UP (ref 0.5–1.3)
DIFF PNL FLD: ABNORMAL
EGFR: 73 ML/MIN/1.73M2 — SIGNIFICANT CHANGE UP
EGFR: 75 ML/MIN/1.73M2 — SIGNIFICANT CHANGE UP
EGFR: 83 ML/MIN/1.73M2 — SIGNIFICANT CHANGE UP
EOSINOPHIL # BLD AUTO: 0.01 K/UL — SIGNIFICANT CHANGE UP (ref 0–0.5)
EOSINOPHIL NFR BLD AUTO: 0.1 % — SIGNIFICANT CHANGE UP (ref 0–6)
GLUCOSE SERPL-MCNC: 115 MG/DL — HIGH (ref 70–99)
GLUCOSE SERPL-MCNC: 123 MG/DL — HIGH (ref 70–99)
GLUCOSE SERPL-MCNC: 126 MG/DL — HIGH (ref 70–99)
GLUCOSE UR QL: NEGATIVE — SIGNIFICANT CHANGE UP
HCT VFR BLD CALC: 23.3 % — LOW (ref 39–50)
HGB BLD-MCNC: 8.2 G/DL — LOW (ref 13–17)
IMM GRANULOCYTES NFR BLD AUTO: 0.4 % — SIGNIFICANT CHANGE UP (ref 0–0.9)
KETONES UR-MCNC: NEGATIVE — SIGNIFICANT CHANGE UP
LACTATE SERPL-SCNC: 0.7 MMOL/L — SIGNIFICANT CHANGE UP (ref 0.7–2)
LEUKOCYTE ESTERASE UR-ACNC: NEGATIVE — SIGNIFICANT CHANGE UP
LYMPHOCYTES # BLD AUTO: 1.74 K/UL — SIGNIFICANT CHANGE UP (ref 1–3.3)
LYMPHOCYTES # BLD AUTO: 17.2 % — SIGNIFICANT CHANGE UP (ref 13–44)
MCHC RBC-ENTMCNC: 30.4 PG — SIGNIFICANT CHANGE UP (ref 27–34)
MCHC RBC-ENTMCNC: 35.2 GM/DL — SIGNIFICANT CHANGE UP (ref 32–36)
MCV RBC AUTO: 86.3 FL — SIGNIFICANT CHANGE UP (ref 80–100)
MONOCYTES # BLD AUTO: 1.13 K/UL — HIGH (ref 0–0.9)
MONOCYTES NFR BLD AUTO: 11.2 % — SIGNIFICANT CHANGE UP (ref 2–14)
NEUTROPHILS # BLD AUTO: 7.19 K/UL — SIGNIFICANT CHANGE UP (ref 1.8–7.4)
NEUTROPHILS NFR BLD AUTO: 70.9 % — SIGNIFICANT CHANGE UP (ref 43–77)
NITRITE UR-MCNC: NEGATIVE — SIGNIFICANT CHANGE UP
NRBC # BLD: 0 /100 WBCS — SIGNIFICANT CHANGE UP (ref 0–0)
PH UR: 6 — SIGNIFICANT CHANGE UP (ref 5–8)
PLATELET # BLD AUTO: 190 K/UL — SIGNIFICANT CHANGE UP (ref 150–400)
POTASSIUM SERPL-MCNC: 3.7 MMOL/L — SIGNIFICANT CHANGE UP (ref 3.5–5.3)
POTASSIUM SERPL-MCNC: 3.9 MMOL/L — SIGNIFICANT CHANGE UP (ref 3.5–5.3)
POTASSIUM SERPL-MCNC: 3.9 MMOL/L — SIGNIFICANT CHANGE UP (ref 3.5–5.3)
POTASSIUM SERPL-SCNC: 3.7 MMOL/L — SIGNIFICANT CHANGE UP (ref 3.5–5.3)
POTASSIUM SERPL-SCNC: 3.9 MMOL/L — SIGNIFICANT CHANGE UP (ref 3.5–5.3)
POTASSIUM SERPL-SCNC: 3.9 MMOL/L — SIGNIFICANT CHANGE UP (ref 3.5–5.3)
PROLACTIN SERPL-MCNC: 23.7 NG/ML — HIGH (ref 4.1–18.4)
PROT SERPL-MCNC: 6 G/DL — SIGNIFICANT CHANGE UP (ref 6–8.3)
PROT UR-MCNC: 30 MG/DL
RBC # BLD: 2.7 M/UL — LOW (ref 4.2–5.8)
RBC # FLD: 12.6 % — SIGNIFICANT CHANGE UP (ref 10.3–14.5)
SODIUM SERPL-SCNC: 129 MMOL/L — LOW (ref 135–145)
SODIUM SERPL-SCNC: 131 MMOL/L — LOW (ref 135–145)
SODIUM SERPL-SCNC: 134 MMOL/L — LOW (ref 135–145)
SP GR SPEC: 1.01 — SIGNIFICANT CHANGE UP (ref 1.01–1.02)
UROBILINOGEN FLD QL: NEGATIVE — SIGNIFICANT CHANGE UP
WBC # BLD: 10.13 K/UL — SIGNIFICANT CHANGE UP (ref 3.8–10.5)
WBC # FLD AUTO: 10.13 K/UL — SIGNIFICANT CHANGE UP (ref 3.8–10.5)

## 2022-12-04 PROCEDURE — 99233 SBSQ HOSP IP/OBS HIGH 50: CPT

## 2022-12-04 PROCEDURE — 99232 SBSQ HOSP IP/OBS MODERATE 35: CPT

## 2022-12-04 PROCEDURE — 71045 X-RAY EXAM CHEST 1 VIEW: CPT | Mod: 26

## 2022-12-04 RX ORDER — SODIUM CHLORIDE 9 MG/ML
500 INJECTION INTRAMUSCULAR; INTRAVENOUS; SUBCUTANEOUS ONCE
Refills: 0 | Status: COMPLETED | OUTPATIENT
Start: 2022-12-04 | End: 2022-12-04

## 2022-12-04 RX ADMIN — FAMOTIDINE 20 MILLIGRAM(S): 10 INJECTION INTRAVENOUS at 05:22

## 2022-12-04 RX ADMIN — SENNA PLUS 2 TABLET(S): 8.6 TABLET ORAL at 21:41

## 2022-12-04 RX ADMIN — Medication 975 MILLIGRAM(S): at 22:43

## 2022-12-04 RX ADMIN — Medication 81 MILLIGRAM(S): at 05:22

## 2022-12-04 RX ADMIN — SODIUM CHLORIDE 1000 MILLILITER(S): 9 INJECTION INTRAMUSCULAR; INTRAVENOUS; SUBCUTANEOUS at 07:24

## 2022-12-04 RX ADMIN — Medication 325 MILLIGRAM(S): at 16:54

## 2022-12-04 RX ADMIN — SODIUM CHLORIDE 1000 MILLILITER(S): 9 INJECTION INTRAMUSCULAR; INTRAVENOUS; SUBCUTANEOUS at 00:00

## 2022-12-04 RX ADMIN — Medication 325 MILLIGRAM(S): at 05:22

## 2022-12-04 RX ADMIN — ATORVASTATIN CALCIUM 40 MILLIGRAM(S): 80 TABLET, FILM COATED ORAL at 21:42

## 2022-12-04 RX ADMIN — FAMOTIDINE 20 MILLIGRAM(S): 10 INJECTION INTRAVENOUS at 16:55

## 2022-12-04 RX ADMIN — OXYCODONE HYDROCHLORIDE 10 MILLIGRAM(S): 5 TABLET ORAL at 16:54

## 2022-12-04 RX ADMIN — Medication 975 MILLIGRAM(S): at 21:43

## 2022-12-04 RX ADMIN — Medication 81 MILLIGRAM(S): at 16:54

## 2022-12-04 RX ADMIN — LOSARTAN POTASSIUM 25 MILLIGRAM(S): 100 TABLET, FILM COATED ORAL at 05:22

## 2022-12-04 NOTE — PROGRESS NOTE ADULT - SUBJECTIVE AND OBJECTIVE BOX
Cc: bilateral total knee arthroplasty on 22 with gait difficulties    HPI: Seen in AM - patient had fever of 102F overnight. Labs were drawn. This AM no fever and patient denies any complaints.  Pain controlled, no chest pain, no N/V. No other new ROS  Has been tolerating rehabilitation program.    acetaminophen     Tablet .. 975 milliGRAM(s) Oral every 8 hours PRN  aspirin  chewable 81 milliGRAM(s) Oral every 12 hours  atorvastatin 40 milliGRAM(s) Oral at bedtime  famotidine    Tablet 20 milliGRAM(s) Oral two times a day  ferrous    sulfate 325 milliGRAM(s) Oral two times a day  losartan 25 milliGRAM(s) Oral daily  oxyCODONE    IR 10 milliGRAM(s) Oral every 4 hours PRN  oxyCODONE    IR 5 milliGRAM(s) Oral every 4 hours PRN  potassium chloride   Powder 40 milliEquivalent(s) Oral every 4 hours  senna 2 Tablet(s) Oral at bedtime                             8.2    10.13 )-----------( 190      ( 04 Dec 2022 00:34 )             23.3     12-04    131<L>  |  97  |  19  ----------------------------<  123<H>  3.9   |  24  |  1.08    Ca    8.3<L>      04 Dec 2022 07:10    TPro  6.0  /  Alb  2.6<L>  /  TBili  0.7  /  DBili  x   /  AST  22  /  ALT  12  /  AlkPhos  54  12-04      Urinalysis Basic - ( 04 Dec 2022 01:50 )    Color: Yellow / Appearance: Clear / S.010 / pH: x  Gluc: x / Ketone: Negative  / Bili: Negative / Urobili: Negative   Blood: x / Protein: 30 mg/dL / Nitrite: Negative   Leuk Esterase: Negative / RBC: 0-4 /HPF / WBC Negative /HPF   Sq Epi: x / Non Sq Epi: Neg.-Few / Bacteria: Negative /HPF        T(C): 36.7 (22 @ 08:14), Max: 36.8 (22 @ 22:01)  HR: 88 (22 @ 08:14) (82 - 91)  BP: 112/60 (22 @ 08:14) (111/68 - 128/71)  RR: 16 (22 @ 08:14) (15 - 16)  SpO2: 100% (22 @ 08:14) (97% - 100%)    In NAD  HEENT- EOMI  Heart- Well Perfused  Lungs- No resp distress, no use of accessory resp muscles  Abd- soft, NT  Ext- No calf pain; bilateral TKR with dressings in place without any erythema, drainage or effusion  Neuro- Exam unchanged  Psych- Affect wnl      Imp: Patient with diagnosis of    bilateral total knee arthroplasty on 22  admitted for comprehensive acute rehabilitation.    Plan:  - Continue therapies  - DVT prophylaxis  - Skin- Turn q2h, check skin daily  - Continue current medications; patient medically stable.   - Patient is stable to continue current rehabilitation program  - Admission labs reviewed:  - Anemia - monitor Hgb levels - medicine on board  - Hypokalemia - monitor K levels - medicine on board  - New episode of fever, SIRS criteria not met. CBC, CMP, UA reviewed; Blood culture pending; medicine on board - follow up CXR, COVID result (12/3/22 was neg on admission), blood Cx    From patient's admission note:  72y M with a history of HTN, HLD, OA, who presented to [facility] on  with complaint of bilateral knee pain and OA who underwent bilateral total knee arthroplasty on 22. Hospital course supposedly uncomplicated. Admitted to [place] acute inpatient rehab on  for ADL, gait, and functional impairments.       #Functional deficits due to OA s/p Bilateral Total Knee Arthroplasty  -b/l TKA 22  -pain control  -DVT ppx with ASA BID for 6 weeks (till )  - Comprehensive Multidisciplinary Rehab Program     3 hours a day, 5 days a week with PT/OT/SLP     P&O as needed    #HTN  c/w home dose losartan    #HLD  c/w equivalent home dose statin (at home on on Crestor 10mg)    Pain Management:  - Tylenol PRN  -oxycodone PRN  -Ice PRN    GI/Bowel:  - Senna QHS, Miralax daily   - GI ppx: pepcid    /Bladder:   - Encourage timed voids every 4 hours while awake       Skin/Pressure Injury:  - Skin assessment on admission: intact  - Monitor Incisions: Bilateral knee surgical incision with aquacel to be removed 7 days post op ()  Diet:   - Diet Consistency/Modifications: Reg w/ thins - DASH    DVT ppx:  - ASA BID      Restrictions/Precautions:  - Weight bearing status: WBAT  - Precautions: Falls    ---------------  Outpatient Follow-up:    Dr. Jose Angel Marrero  Internal Medicine  [address]  [address]  [place] [address]  [phone]    Dr. Dick Morelos  Orthopedic Surgery  [address]  [place] [address]  [phone]  Follow-up in 3 weeks

## 2022-12-04 NOTE — PROGRESS NOTE ADULT - SUBJECTIVE AND OBJECTIVE BOX
Patient is a 72y old  Male who presents with a chief complaint of OA s/p B/L TKA (03 Dec 2022 12:06)      Patient seen and examined at bedside.  Patient had fever overnight  Still having some pain in both knees    ALLERGIES:  No Known Allergies    MEDICATIONS  (STANDING):  aspirin  chewable 81 milliGRAM(s) Oral every 12 hours  atorvastatin 40 milliGRAM(s) Oral at bedtime  famotidine    Tablet 20 milliGRAM(s) Oral two times a day  ferrous    sulfate 325 milliGRAM(s) Oral two times a day  losartan 25 milliGRAM(s) Oral daily  senna 2 Tablet(s) Oral at bedtime    MEDICATIONS  (PRN):  acetaminophen     Tablet .. 975 milliGRAM(s) Oral every 8 hours PRN Temp greater or equal to 38C (100.4F), Mild Pain (1 - 3)  oxyCODONE    IR 10 milliGRAM(s) Oral every 4 hours PRN Severe Pain (7 - 10)  oxyCODONE    IR 5 milliGRAM(s) Oral every 4 hours PRN Moderate Pain (4 - 6)  polyethylene glycol 3350 17 Gram(s) Oral daily PRN Constipation    Vital Signs Last 24 Hrs  T(F): 98.3 (04 Dec 2022 08:06), Max: 102.1 (03 Dec 2022 21:19)  HR: 85 (04 Dec 2022 08:06) (85 - 89)  BP: 105/74 (04 Dec 2022 08:06) (105/64 - 110/68)  RR: 16 (04 Dec 2022 08:06) (16 - 16)  SpO2: 97% (04 Dec 2022 08:06) (96% - 97%)  I&O's Summary    BMI (kg/m2): 29.2 (22 @ 22:01)    PHYSICAL EXAM:  GENERAL: NAD  HENT:  Atraumatic, Normocephalic; No tonsillar erythema, exudates, or enlargement; Moist mucous membranes;   EYES: EOMI, PERRLA, conjunctiva and sclera clear, no lid-lag  NECK: Supple, No JVD, Normal thyroid  CHEST/LUNG: Clear to percussion bilaterally; No rales, rhonchi, wheezing, or rubs; normal respiratory effort, no intercostal retractions  HEART: Regular rate and rhythm; No murmurs, rubs, or gallops; No pitting edema  ABDOMEN: Soft, Nontender, Nondistended; Bowel sounds present; No HSM  MUSCULOSKELETAL/EXTREMITIES:  2+ Peripheral Pulses, No clubbing or digital cyanosis  PSYCH: Appropriate affect, Alert & Awake; Good judgement    LABS:                        8.2    10.13 )-----------( 190      ( 04 Dec 2022 00:34 )             23.3       12-    131  |  97  |  19  ----------------------------<  123  3.9   |  24  |  1.08    Ca    8.3      04 Dec 2022 07:10    TPro  6.0  /  Alb  2.6  /  TBili  0.7  /  DBili  x   /  AST  22  /  ALT  12  /  AlkPhos  54        Lactate, Blood: 0.7 mmol/L ( @ 00:34)    Urinalysis Basic - ( 04 Dec 2022 01:50 )    Color: Yellow / Appearance: Clear / S.010 / pH: x  Gluc: x / Ketone: Negative  / Bili: Negative / Urobili: Negative   Blood: x / Protein: 30 mg/dL / Nitrite: Negative   Leuk Esterase: Negative / RBC: 0-4 /HPF / WBC Negative /HPF   Sq Epi: x / Non Sq Epi: Neg.-Few / Bacteria: Negative /HPF    COVID-19 PCR: NotDetec (22 @ 06:00)      Care Discussed with Rehab Attending and Other Providers

## 2022-12-04 NOTE — PROGRESS NOTE ADULT - ASSESSMENT
73 yo man with history of HTN, HLD, Rotator Cuff Tear (s/p Bilateral Repairs: Left 05/29/2013; Right: 08/28/2019), DDD (s/p Lumbar Laminectomy 1986) and Osteoarthritis admitted to  after hospital course at [facility] for bilateral total knee replacement.     Physical Debility due to B/L TKR  - Comprehensive rehab as per primary team  - Bowel/Pain regimen as per primary team  - Continue ASA BID with famotidine for GI Prophylaxis     Fever  - Given IVF overnight  - F/U BCx x1 and COVID  - UA is neg  - ordering CXR    Hyperlipidemia   - Continue atorvastatin    Normocytic Anemia likely MARTIN and post op anemia  - Continue iron supplements BID    Hypertension  - Continue losartan  - Monitor vital signs    Hypokalemia - resolved    DVT Prophylaxis with aspirin twice a day

## 2022-12-05 LAB
ALBUMIN SERPL ELPH-MCNC: 2.5 G/DL — LOW (ref 3.3–5)
ALP SERPL-CCNC: 49 U/L — SIGNIFICANT CHANGE UP (ref 40–120)
ALT FLD-CCNC: 28 U/L — SIGNIFICANT CHANGE UP (ref 10–45)
ANION GAP SERPL CALC-SCNC: 7 MMOL/L — SIGNIFICANT CHANGE UP (ref 5–17)
AST SERPL-CCNC: 33 U/L — SIGNIFICANT CHANGE UP (ref 10–40)
BASOPHILS # BLD AUTO: 0.02 K/UL — SIGNIFICANT CHANGE UP (ref 0–0.2)
BASOPHILS NFR BLD AUTO: 0.3 % — SIGNIFICANT CHANGE UP (ref 0–2)
BILIRUB SERPL-MCNC: 0.9 MG/DL — SIGNIFICANT CHANGE UP (ref 0.2–1.2)
BUN SERPL-MCNC: 16 MG/DL — SIGNIFICANT CHANGE UP (ref 7–23)
CALCIUM SERPL-MCNC: 8.1 MG/DL — LOW (ref 8.4–10.5)
CHLORIDE SERPL-SCNC: 101 MMOL/L — SIGNIFICANT CHANGE UP (ref 96–108)
CO2 SERPL-SCNC: 29 MMOL/L — SIGNIFICANT CHANGE UP (ref 22–31)
CREAT SERPL-MCNC: 1.02 MG/DL — SIGNIFICANT CHANGE UP (ref 0.5–1.3)
EGFR: 78 ML/MIN/1.73M2 — SIGNIFICANT CHANGE UP
EOSINOPHIL # BLD AUTO: 0.04 K/UL — SIGNIFICANT CHANGE UP (ref 0–0.5)
EOSINOPHIL NFR BLD AUTO: 0.5 % — SIGNIFICANT CHANGE UP (ref 0–6)
GLUCOSE SERPL-MCNC: 112 MG/DL — HIGH (ref 70–99)
HCT VFR BLD CALC: 24.9 % — LOW (ref 39–50)
HGB BLD-MCNC: 8.4 G/DL — LOW (ref 13–17)
IMM GRANULOCYTES NFR BLD AUTO: 0.5 % — SIGNIFICANT CHANGE UP (ref 0–0.9)
LYMPHOCYTES # BLD AUTO: 1.76 K/UL — SIGNIFICANT CHANGE UP (ref 1–3.3)
LYMPHOCYTES # BLD AUTO: 23.5 % — SIGNIFICANT CHANGE UP (ref 13–44)
MCHC RBC-ENTMCNC: 30.1 PG — SIGNIFICANT CHANGE UP (ref 27–34)
MCHC RBC-ENTMCNC: 33.7 GM/DL — SIGNIFICANT CHANGE UP (ref 32–36)
MCV RBC AUTO: 89.2 FL — SIGNIFICANT CHANGE UP (ref 80–100)
MONOCYTES # BLD AUTO: 0.94 K/UL — HIGH (ref 0–0.9)
MONOCYTES NFR BLD AUTO: 12.5 % — SIGNIFICANT CHANGE UP (ref 2–14)
NEUTROPHILS # BLD AUTO: 4.7 K/UL — SIGNIFICANT CHANGE UP (ref 1.8–7.4)
NEUTROPHILS NFR BLD AUTO: 62.7 % — SIGNIFICANT CHANGE UP (ref 43–77)
NRBC # BLD: 0 /100 WBCS — SIGNIFICANT CHANGE UP (ref 0–0)
PLATELET # BLD AUTO: 213 K/UL — SIGNIFICANT CHANGE UP (ref 150–400)
POTASSIUM SERPL-MCNC: 4 MMOL/L — SIGNIFICANT CHANGE UP (ref 3.5–5.3)
POTASSIUM SERPL-SCNC: 4 MMOL/L — SIGNIFICANT CHANGE UP (ref 3.5–5.3)
PROT SERPL-MCNC: 6.1 G/DL — SIGNIFICANT CHANGE UP (ref 6–8.3)
RBC # BLD: 2.79 M/UL — LOW (ref 4.2–5.8)
RBC # FLD: 12.9 % — SIGNIFICANT CHANGE UP (ref 10.3–14.5)
SARS-COV-2 RNA SPEC QL NAA+PROBE: SIGNIFICANT CHANGE UP
SODIUM SERPL-SCNC: 137 MMOL/L — SIGNIFICANT CHANGE UP (ref 135–145)
WBC # BLD: 7.5 K/UL — SIGNIFICANT CHANGE UP (ref 3.8–10.5)
WBC # FLD AUTO: 7.5 K/UL — SIGNIFICANT CHANGE UP (ref 3.8–10.5)

## 2022-12-05 PROCEDURE — 93970 EXTREMITY STUDY: CPT | Mod: 26

## 2022-12-05 PROCEDURE — 99233 SBSQ HOSP IP/OBS HIGH 50: CPT

## 2022-12-05 PROCEDURE — 99232 SBSQ HOSP IP/OBS MODERATE 35: CPT | Mod: GC

## 2022-12-05 RX ORDER — LOSARTAN POTASSIUM 100 MG/1
25 TABLET, FILM COATED ORAL DAILY
Refills: 0 | Status: DISCONTINUED | OUTPATIENT
Start: 2022-12-06 | End: 2022-12-15

## 2022-12-05 RX ORDER — FERROUS SULFATE 325(65) MG
325 TABLET ORAL DAILY
Refills: 0 | Status: DISCONTINUED | OUTPATIENT
Start: 2022-12-05 | End: 2022-12-15

## 2022-12-05 RX ADMIN — ATORVASTATIN CALCIUM 40 MILLIGRAM(S): 80 TABLET, FILM COATED ORAL at 21:09

## 2022-12-05 RX ADMIN — Medication 325 MILLIGRAM(S): at 05:29

## 2022-12-05 RX ADMIN — FAMOTIDINE 20 MILLIGRAM(S): 10 INJECTION INTRAVENOUS at 17:43

## 2022-12-05 RX ADMIN — SENNA PLUS 2 TABLET(S): 8.6 TABLET ORAL at 21:09

## 2022-12-05 RX ADMIN — Medication 975 MILLIGRAM(S): at 21:09

## 2022-12-05 RX ADMIN — Medication 325 MILLIGRAM(S): at 12:43

## 2022-12-05 RX ADMIN — Medication 81 MILLIGRAM(S): at 17:43

## 2022-12-05 RX ADMIN — OXYCODONE HYDROCHLORIDE 5 MILLIGRAM(S): 5 TABLET ORAL at 13:10

## 2022-12-05 RX ADMIN — Medication 81 MILLIGRAM(S): at 05:30

## 2022-12-05 RX ADMIN — LOSARTAN POTASSIUM 25 MILLIGRAM(S): 100 TABLET, FILM COATED ORAL at 05:30

## 2022-12-05 RX ADMIN — FAMOTIDINE 20 MILLIGRAM(S): 10 INJECTION INTRAVENOUS at 05:30

## 2022-12-05 RX ADMIN — OXYCODONE HYDROCHLORIDE 5 MILLIGRAM(S): 5 TABLET ORAL at 08:45

## 2022-12-05 RX ADMIN — Medication 975 MILLIGRAM(S): at 22:11

## 2022-12-05 NOTE — PROGRESS NOTE ADULT - NS ATTEND AMEND GEN_ALL_CORE FT
Rehab Attending- Patient seen and examined by me - Case discussed, above note reviewed by me with modifications made    Doing well  seen in PT Gym  Good Flexion both knees -mildly limited terminal extension  surgical dressings intact- no drainage  labs reviewed by me- stable  Duplex LEs neg for DVT  No further temps since PM 12/2- PANDEY negative  to continue intensive rehab program

## 2022-12-05 NOTE — DIETITIAN INITIAL EVALUATION ADULT - NS FNS DIET ORDER
DASH-TLC Diet w/ Thin Liquids (IDDSI Level 0)   Recommend Change Diet to Regular Diet  Declines Nutrition Supplementation - Recommend Initiate Multivitamin,  Education Provided on Proper Nutrition

## 2022-12-05 NOTE — DIETITIAN INITIAL EVALUATION ADULT - FACTORS AFF FOOD INTAKE
States Poor PO Intake over Last Week after Surgery - Improved Today (Per Patient)/persistent constipation/persistent lack of appetite

## 2022-12-05 NOTE — DIETITIAN INITIAL EVALUATION ADULT - ORAL INTAKE PTA/DIET HISTORY
Patient Does Not Follow Diet @Home  Consumes 2 Meals a Day (Usually Breakfast & Dinner)  Wife Usually Cooks For Patient   Doesn't Take Vitamin/Supplements @Home

## 2022-12-05 NOTE — PROGRESS NOTE ADULT - SUBJECTIVE AND OBJECTIVE BOX
Cc: bilateral total knee arthroplasty on 22 with gait difficulties    HPI: NAD overnight, patient had fever of 102F over the weekend, now resolved-septic workup neg, denies any complaints. B/l knees w/edema, on ASA BID-will check US doppler, r/o DVT. dressings CDI. ICE to b/l knees. Encourage PO for low BPs, Cozaar-parameters added.   Pain controlled, no chest pain, no N/V. No other new ROS  Has been tolerating rehabilitation program.      VITALS  Vital Signs Last 24 Hrs  T(C): 37.2 (05 Dec 2022 08:59), Max: 37.4 (04 Dec 2022 20:35)  T(F): 98.9 (05 Dec 2022 08:59), Max: 99.3 (04 Dec 2022 20:35)  HR: 83 (05 Dec 2022 08:59) (70 - 83)  BP: 99/60 (05 Dec 2022 08:59) (99/60 - 114/62)  BP(mean): --  RR: 15 (05 Dec 2022 08:59) (15 - 16)  SpO2: 96% (05 Dec 2022 08:59) (96% - 96%)    Parameters below as of 04 Dec 2022 20:35  Patient On (Oxygen Delivery Method): room air      RECENT LABS                        8.4    7.50  )-----------( 213      ( 05 Dec 2022 07:16 )             24.9     12-05    137  |  101  |  16  ----------------------------<  112<H>  4.0   |  29  |  1.02    Ca    8.1<L>      05 Dec 2022 07:16    TPro  6.1  /  Alb  2.5<L>  /  TBili  0.9  /  DBili  x   /  AST  33  /  ALT  28  /  AlkPhos  49  12-05      LIVER FUNCTIONS - ( 05 Dec 2022 07:16 )  Alb: 2.5 g/dL / Pro: 6.1 g/dL / ALK PHOS: 49 U/L / ALT: 28 U/L / AST: 33 U/L / GGT: x           Urinalysis Basic - ( 04 Dec 2022 01:50 )    Color: Yellow / Appearance: Clear / S.010 / pH: x  Gluc: x / Ketone: Negative  / Bili: Negative / Urobili: Negative   Blood: x / Protein: 30 mg/dL / Nitrite: Negative   Leuk Esterase: Negative / RBC: 0-4 /HPF / WBC Negative /HPF   Sq Epi: x / Non Sq Epi: Neg.-Few / Bacteria: Negative /HPF      CURRENT MEDICATIONS  MEDICATIONS  (STANDING):  aspirin  chewable 81 milliGRAM(s) Oral every 12 hours  atorvastatin 40 milliGRAM(s) Oral at bedtime  coronavirus bivalent (EUA) Booster Vaccine (PFIZER) 0.3 milliLiter(s) IntraMuscular once  famotidine    Tablet 20 milliGRAM(s) Oral two times a day  ferrous    sulfate 325 milliGRAM(s) Oral daily  senna 2 Tablet(s) Oral at bedtime    MEDICATIONS  (PRN):  acetaminophen     Tablet .. 975 milliGRAM(s) Oral every 8 hours PRN Temp greater or equal to 38C (100.4F), Mild Pain (1 - 3)  oxyCODONE    IR 10 milliGRAM(s) Oral every 4 hours PRN Severe Pain (7 - 10)  oxyCODONE    IR 5 milliGRAM(s) Oral every 4 hours PRN Moderate Pain (4 - 6)  polyethylene glycol 3350 17 Gram(s) Oral daily PRN Constipation      Physical Exam:   Constitutional - NAD, Comfortable  HEENT - NCAT, EOMI  Neck - Supple, No limited ROM  Chest - good chest expansion, good respiratory effort, CTAB   Cardio - warm and well perfused, RRR, no murmur  Abdomen -  Soft, NTND  Extremities - + swelling bilateral knees, No calf tenderness   Neurologic Exam:                    Cognitive -             Orientation: Awake, Alert, AAO to self, place, date, year, situation            Attention at baseline,   Speech - Fluent       Motor -                      LEFT    UE - ShAB 5/5, EF 5/5, EE 5/5, WE 5/5,  WNL                    RIGHT UE - ShAB 5/5, EF 5/5, EE 5/5, WE 5/5,  WNL                    LEFT    LE - HF 5/5, KE not tested due to pain, DF 5/5, PF 5/5                    RIGHT LE - HF 5/5, KE not tested due to pain, DF 5/5, PF 5/5        Psychiatric - Mood stable, Affect WNL  Skin on admission: BL surgical incision of the bilateral knees with aquacel dressing    Continue comprehensive acute rehab program consisting of 3hrs/day of OT/PT. Cc: bilateral total knee arthroplasty on 22 with gait difficulties    HPI: NAD overnight, patient had fever of 102F over the weekend, now resolved-septic workup neg, denies any complaints. B/l knees w/edema, on ASA BID-will check US doppler, r/o DVT. dressings CDI. ICE to b/l knees. Encourage PO for low BPs, Cozaar-parameters added.   Pain controlled, no chest pain, no N/V. No other new ROS  Has been tolerating rehabilitation program.      VITALS  Vital Signs Last 24 Hrs  T(C): 37.2 (05 Dec 2022 08:59), Max: 37.4 (04 Dec 2022 20:35)  T(F): 98.9 (05 Dec 2022 08:59), Max: 99.3 (04 Dec 2022 20:35)  HR: 83 (05 Dec 2022 08:59) (70 - 83)  BP: 99/60 (05 Dec 2022 08:59) (99/60 - 114/62)  BP(mean): --  RR: 15 (05 Dec 2022 08:59) (15 - 16)  SpO2: 96% (05 Dec 2022 08:59) (96% - 96%)    Parameters below as of 04 Dec 2022 20:35  Patient On (Oxygen Delivery Method): room air      RECENT LABS                        8.4    7.50  )-----------( 213      ( 05 Dec 2022 07:16 )             24.9     12-05    137  |  101  |  16  ----------------------------<  112<H>  4.0   |  29  |  1.02    Ca    8.1<L>      05 Dec 2022 07:16    TPro  6.1  /  Alb  2.5<L>  /  TBili  0.9  /  DBili  x   /  AST  33  /  ALT  28  /  AlkPhos  49  12-05      LIVER FUNCTIONS - ( 05 Dec 2022 07:16 )  Alb: 2.5 g/dL / Pro: 6.1 g/dL / ALK PHOS: 49 U/L / ALT: 28 U/L / AST: 33 U/L / GGT: x           Urinalysis Basic - ( 04 Dec 2022 01:50 )    Color: Yellow / Appearance: Clear / S.010 / pH: x  Gluc: x / Ketone: Negative  / Bili: Negative / Urobili: Negative   Blood: x / Protein: 30 mg/dL / Nitrite: Negative   Leuk Esterase: Negative / RBC: 0-4 /HPF / WBC Negative /HPF   Sq Epi: x / Non Sq Epi: Neg.-Few / Bacteria: Negative /HPF      CURRENT MEDICATIONS  MEDICATIONS  (STANDING):  aspirin  chewable 81 milliGRAM(s) Oral every 12 hours  atorvastatin 40 milliGRAM(s) Oral at bedtime  coronavirus bivalent (EUA) Booster Vaccine (PFIZER) 0.3 milliLiter(s) IntraMuscular once  famotidine    Tablet 20 milliGRAM(s) Oral two times a day  ferrous    sulfate 325 milliGRAM(s) Oral daily  senna 2 Tablet(s) Oral at bedtime    MEDICATIONS  (PRN):  acetaminophen     Tablet .. 975 milliGRAM(s) Oral every 8 hours PRN Temp greater or equal to 38C (100.4F), Mild Pain (1 - 3)  oxyCODONE    IR 10 milliGRAM(s) Oral every 4 hours PRN Severe Pain (7 - 10)  oxyCODONE    IR 5 milliGRAM(s) Oral every 4 hours PRN Moderate Pain (4 - 6)  polyethylene glycol 3350 17 Gram(s) Oral daily PRN Constipation      Physical Exam:   Constitutional - NAD, Comfortable  HEENT - NCAT, EOMI  Neck - Supple, No limited ROM  Chest - good chest expansion, good respiratory effort, CTAB   Cardio - warm and well perfused, RRR, no murmur  Abdomen -  Soft, NTND  Extremities - + swelling bilateral knees, No calf tenderness   Neurologic Exam:                    Cognitive -             Orientation: Awake, Alert, AAO to self, place, date, year, situation            Attention at baseline,   Speech - Fluent       Motor -                      LEFT    UE - ShAB 5/5, EF 5/5, EE 5/5, WE 5/5,  WNL                    RIGHT UE - ShAB 5/5, EF 5/5, EE 5/5, WE 5/5,  WNL                    LEFT    LE - HF 5/5, KE not tested due to pain, DF 5/5, PF 5/5 > 90 flexion both knees                    RIGHT LE - HF 5/5, KE not tested due to pain, DF 5/5, PF 5/5        Psychiatric - Mood stable, Affect WNL  Skin on admission: BL surgical incision of the bilateral knees with aquacel dressing    Continue comprehensive acute rehab program consisting of 3hrs/day of OT/PT.

## 2022-12-05 NOTE — DIETITIAN NUTRITION RISK NOTIFICATION - TREATMENT: THE FOLLOWING DIET HAS BEEN RECOMMENDED
Recommend Change Diet to Low Sodium Diet   Declines Nutrition Supplementation   Add Multivitamin

## 2022-12-05 NOTE — PROGRESS NOTE ADULT - SUBJECTIVE AND OBJECTIVE BOX
Patient is a 72y old  Male who presents with a chief complaint of OA s/p B/L TKA (05 Dec 2022 10:59)      INTERVAL History of Present Illness/OVERNIGHT EVENTS: participating in PT/OT     MEDICATIONS  (STANDING):  aspirin  chewable 81 milliGRAM(s) Oral every 12 hours  atorvastatin 40 milliGRAM(s) Oral at bedtime  coronavirus bivalent (EUA) Booster Vaccine (PFIZER) 0.3 milliLiter(s) IntraMuscular once  famotidine    Tablet 20 milliGRAM(s) Oral two times a day  ferrous    sulfate 325 milliGRAM(s) Oral daily  senna 2 Tablet(s) Oral at bedtime    MEDICATIONS  (PRN):  acetaminophen     Tablet .. 975 milliGRAM(s) Oral every 8 hours PRN Temp greater or equal to 38C (100.4F), Mild Pain (1 - 3)  oxyCODONE    IR 10 milliGRAM(s) Oral every 4 hours PRN Severe Pain (7 - 10)  oxyCODONE    IR 5 milliGRAM(s) Oral every 4 hours PRN Moderate Pain (4 - 6)  polyethylene glycol 3350 17 Gram(s) Oral daily PRN Constipation      Allergies    No Known Allergies    Intolerances        REVIEW OF SYSTEMS:  Other systems currently negative unless otherwise specified above.    Vital Signs Last 24 Hrs  T(C): 37.2 (05 Dec 2022 08:59), Max: 37.4 (04 Dec 2022 20:35)  T(F): 98.9 (05 Dec 2022 08:59), Max: 99.3 (04 Dec 2022 20:35)  HR: 83 (05 Dec 2022 08:59) (70 - 83)  BP: 99/60 (05 Dec 2022 08:59) (99/60 - 114/62)  BP(mean): --  RR: 15 (05 Dec 2022 08:59) (15 - 16)  SpO2: 96% (05 Dec 2022 08:59) (96% - 96%)    Parameters below as of 04 Dec 2022 20:35  Patient On (Oxygen Delivery Method): room air            PHYSICAL EXAM:  GENERAL: No apparent distress, appears stated age  EYES: Conjunctiva and sclera clear, no discharge  ENMT: Moist mucous membranes, no nasal discharge  CHEST/LUNG: Clear to auscultation bilaterally, no wheeze or rales  HEART: Regular rhythm, no rubs or gallops  ABDOMEN: Soft, Nontender, Nondistended  EXTREMITIES:  No clubbing, cyanosis or edema, TKR dressings  SKIN: No rash, no new discoloration      LABS:                        8.4    7.50  )-----------( 213      ( 05 Dec 2022 07:16 )             24.9     05 Dec 2022 07:16    137    |  101    |  16     ----------------------------<  112    4.0     |  29     |  1.02     Ca    8.1        05 Dec 2022 07:16    TPro  6.1    /  Alb  2.5    /  TBili  0.9    /  DBili  x      /  AST  33     /  ALT  28     /  AlkPhos  49     05 Dec 2022 07:16      Urinalysis Basic - ( 04 Dec 2022 01:50 )    Color: Yellow / Appearance: Clear / S.010 / pH: x  Gluc: x / Ketone: Negative  / Bili: Negative / Urobili: Negative   Blood: x / Protein: 30 mg/dL / Nitrite: Negative   Leuk Esterase: Negative / RBC: 0-4 /HPF / WBC Negative /HPF   Sq Epi: x / Non Sq Epi: Neg.-Few / Bacteria: Negative /HPF      CAPILLARY BLOOD GLUCOSE            RADIOLOGY & ADDITIONAL TESTS:      Images reviewed personally    Consultant Notes Reviewed and Care Discussed with relevant Consultants.

## 2022-12-05 NOTE — DIETITIAN INITIAL EVALUATION ADULT - OTHER INFO
Initial Nutrition Assessment   72yr Old Male   Denies Food Allergy/Intolerance  Tolerates Diet Well - No Chewing/Swallowing Complications (Per Patient)  Surgical Incision on Right/Left Knees & No Pressure Ulcers (as Per Nursing Flow Sheets)  Edema- None Noted Recently(as Per Nursing Flow Sheets)  No Recent Nausea/Vomiting/Diarrhea/Constipation (as Per Patient)

## 2022-12-05 NOTE — DIETITIAN INITIAL EVALUATION ADULT - PERTINENT LABORATORY DATA
12-05    137  |  101  |  16  ----------------------------<  112<H>  4.0   |  29  |  1.02    Ca    8.1<L>      05 Dec 2022 07:16    TPro  6.1  /  Alb  2.5<L>  /  TBili  0.9  /  DBili  x   /  AST  33  /  ALT  28  /  AlkPhos  49  12-05

## 2022-12-05 NOTE — DIETITIAN INITIAL EVALUATION ADULT - PERTINENT MEDS FT
MEDICATIONS  (STANDING):  aspirin  chewable 81 milliGRAM(s) Oral every 12 hours  atorvastatin 40 milliGRAM(s) Oral at bedtime  coronavirus bivalent (EUA) Booster Vaccine (PFIZER) 0.3 milliLiter(s) IntraMuscular once  famotidine    Tablet 20 milliGRAM(s) Oral two times a day  ferrous    sulfate 325 milliGRAM(s) Oral daily  losartan 25 milliGRAM(s) Oral daily  senna 2 Tablet(s) Oral at bedtime    MEDICATIONS  (PRN):  acetaminophen     Tablet .. 975 milliGRAM(s) Oral every 8 hours PRN Temp greater or equal to 38C (100.4F), Mild Pain (1 - 3)  oxyCODONE    IR 10 milliGRAM(s) Oral every 4 hours PRN Severe Pain (7 - 10)  oxyCODONE    IR 5 milliGRAM(s) Oral every 4 hours PRN Moderate Pain (4 - 6)  polyethylene glycol 3350 17 Gram(s) Oral daily PRN Constipation

## 2022-12-05 NOTE — PROGRESS NOTE ADULT - ASSESSMENT
73 yo man with history of HTN, HLD, Rotator Cuff Tear (s/p Bilateral Repairs: Left 05/29/2013; Right: 08/28/2019), DDD (s/p Lumbar Laminectomy 1986) and Osteoarthritis admitted to  after hospital course at [facility] for bilateral total knee replacement.     Physical Debility due to B/L TKR  - Comprehensive rehab as per primary team  - Bowel/Pain regimen as per primary team  - Continue ASA BID with famotidine for GI Prophylaxis     Fever  - UA, CXR negative  - incentive spirometry  - f/up LE duplex  - monitor off abx currently    Hyperlipidemia   - Continue atorvastatin    Normocytic Anemia likely MARTIN and post op anemia  - Continue iron supplements BID    Hypertension  - Continue losartan  - Monitor vital signs    DVT Prophylaxis with aspirin twice a day per ortho protocol.

## 2022-12-05 NOTE — PROGRESS NOTE ADULT - ASSESSMENT
72yM with a history of HTN, HLD, OA, who presented to [facility] on 11/30 with complaint of bilateral knee pain and OA who underwent bilateral total knee arthroplasty on 11/30/22. Hospital course supposedly uncomplicated.        Admitted to [facility] acute inpatient rehab on 12/2 for ADL, gait, and functional impairments.       #Functional deficits due to OA s/p Bilateral Total Knee Arthroplasty  -b/l TKA 11/30/22  -pain control  -DVT ppx with ASA BID for 6 weeks (till 1/11). Check US LEs r/o DVT for recent fever, LE edema. Apply ice.   - Comprehensive Multidisciplinary Rehab Program     3 hours a day, 5 days a week with PT/OT/SLP     P&O as needed    #HTN  -c/w home dose losartan-add parameters. Encouarge PO for BP 90-100s today. Monitor closely-hospitalist to adjust.     #HLD  c/w equivalent home dose statin (at home on on Crestor 10mg)    Pain Management:  - Tylenol PRN  -oxycodone PRN  -Ice PRN    GI/Bowel:  - Senna QHS, Miralax daily   - GI ppx: pepcid    /Bladder:   - Encourage timed voids every 4 hours while awake       Skin/Pressure Injury:  - Skin assessment on admission: intact  - Monitor Incisions: Bilateral knee surgical incision with aquacel to be removed 7 days post op (12/7)  Diet:   - Diet Consistency/Modifications: Reg w/ thins - DASH    DVT ppx:  - ASA BID      Restrictions/Precautions:  - Weight bearing status: WBAT  - Precautions: Falls    ---------------  Outpatient Follow-up:    Dr. Jose Angel Marrero  Internal Medicine  [address]  [address]  [place] [address]  [phone]    Dr. Dick Morelos  Orthopedic Surgery  [address]  [address]  [place] [address]  [phone]  Follow-up in 3 weeks    --------------   72yM with a history of HTN, HLD, OA, who presented to [facility] on 11/30 with complaint of bilateral knee pain and OA who underwent bilateral total knee arthroplasty on 11/30/22. Hospital course supposedly uncomplicated.        Admitted to [place] acute inpatient rehab on 12/2 for ADL, gait, and functional impairments.       #Functional deficits due to OA s/p Bilateral Total Knee Arthroplasty  -b/l TKA 11/30/22  -pain control  -DVT ppx with ASA BID for 6 weeks (till 1/11). Check US LEs r/o DVT for recent fever, LE edema. Apply ice. - DUPLEX DONE - NEG FOR DVT  - Comprehensive Multidisciplinary Rehab Program     3 hours a day, 5 days a week with PT/OT/SLP     P&O as needed    #HTN  -c/w home dose losartan-add parameters. Encouarge PO for BP 90-100s today. Monitor closely-hospitalist to adjust.     #HLD  c/w equivalent home dose statin (at home on on Crestor 10mg)    Pain Management:  - Tylenol PRN  -oxycodone PRN  -Ice PRN    GI/Bowel:  - Senna QHS, Miralax daily   - GI ppx: pepcid    /Bladder:   - Encourage timed voids every 4 hours while awake       Skin/Pressure Injury:  - Skin assessment on admission: intact  - Monitor Incisions: Bilateral knee surgical incision with aquacel to be removed 7 days post op (12/7)  Diet:   - Diet Consistency/Modifications: Reg w/ thins - DASH    DVT ppx:  - ASA BID      Restrictions/Precautions:  - Weight bearing status: WBAT  - Precautions: Falls    ---------------  Outpatient Follow-up:    Dr. Jose Angel Marrero  Internal Medicine  [address] [facility]  [address]  [place] [address]  [phone]    Dr. Dick Morelos  Orthopedic Surgery  [address]  [address]  [place] [address]  [phone]  Follow-up in 3 weeks    --------------

## 2022-12-05 NOTE — DIETITIAN INITIAL EVALUATION ADULT - ADD RECOMMEND
1) Monitor Weights, Intake, Tolerance, Skin, POCT & Labwork  2) Multivitamin Daily  3) Education Provided on Proper Nutrition  4) Continue Nutrition Plan of Care

## 2022-12-06 PROCEDURE — 99233 SBSQ HOSP IP/OBS HIGH 50: CPT

## 2022-12-06 PROCEDURE — 99232 SBSQ HOSP IP/OBS MODERATE 35: CPT | Mod: GC

## 2022-12-06 RX ADMIN — Medication 325 MILLIGRAM(S): at 12:00

## 2022-12-06 RX ADMIN — OXYCODONE HYDROCHLORIDE 5 MILLIGRAM(S): 5 TABLET ORAL at 09:40

## 2022-12-06 RX ADMIN — Medication 81 MILLIGRAM(S): at 18:29

## 2022-12-06 RX ADMIN — Medication 975 MILLIGRAM(S): at 09:40

## 2022-12-06 RX ADMIN — ATORVASTATIN CALCIUM 40 MILLIGRAM(S): 80 TABLET, FILM COATED ORAL at 21:17

## 2022-12-06 RX ADMIN — OXYCODONE HYDROCHLORIDE 5 MILLIGRAM(S): 5 TABLET ORAL at 08:41

## 2022-12-06 RX ADMIN — OXYCODONE HYDROCHLORIDE 5 MILLIGRAM(S): 5 TABLET ORAL at 13:36

## 2022-12-06 RX ADMIN — Medication 81 MILLIGRAM(S): at 05:21

## 2022-12-06 RX ADMIN — Medication 975 MILLIGRAM(S): at 08:41

## 2022-12-06 RX ADMIN — OXYCODONE HYDROCHLORIDE 5 MILLIGRAM(S): 5 TABLET ORAL at 14:30

## 2022-12-06 RX ADMIN — LOSARTAN POTASSIUM 25 MILLIGRAM(S): 100 TABLET, FILM COATED ORAL at 05:20

## 2022-12-06 RX ADMIN — FAMOTIDINE 20 MILLIGRAM(S): 10 INJECTION INTRAVENOUS at 18:29

## 2022-12-06 RX ADMIN — FAMOTIDINE 20 MILLIGRAM(S): 10 INJECTION INTRAVENOUS at 05:21

## 2022-12-06 NOTE — PROGRESS NOTE ADULT - SUBJECTIVE AND OBJECTIVE BOX
Patient is a 72y old  Male who presents with a chief complaint of OA s/p B/L TKA (05 Dec 2022 12:32)      INTERVAL History of Present Illness/OVERNIGHT EVENTS: duplex shows no DVT    MEDICATIONS  (STANDING):  aspirin  chewable 81 milliGRAM(s) Oral every 12 hours  atorvastatin 40 milliGRAM(s) Oral at bedtime  coronavirus bivalent (EUA) Booster Vaccine (PFIZER) 0.3 milliLiter(s) IntraMuscular once  famotidine    Tablet 20 milliGRAM(s) Oral two times a day  ferrous    sulfate 325 milliGRAM(s) Oral daily  losartan 25 milliGRAM(s) Oral daily  senna 2 Tablet(s) Oral at bedtime    MEDICATIONS  (PRN):  acetaminophen     Tablet .. 975 milliGRAM(s) Oral every 8 hours PRN Temp greater or equal to 38C (100.4F), Mild Pain (1 - 3)  oxyCODONE    IR 10 milliGRAM(s) Oral every 4 hours PRN Severe Pain (7 - 10)  oxyCODONE    IR 5 milliGRAM(s) Oral every 4 hours PRN Moderate Pain (4 - 6)  polyethylene glycol 3350 17 Gram(s) Oral daily PRN Constipation      Allergies    No Known Allergies    Intolerances        REVIEW OF SYSTEMS:  Other systems currently negative unless otherwise specified above.    Vital Signs Last 24 Hrs  T(C): 36.8 (06 Dec 2022 07:44), Max: 36.9 (05 Dec 2022 20:22)  T(F): 98.3 (06 Dec 2022 07:44), Max: 98.5 (05 Dec 2022 20:22)  HR: 90 (06 Dec 2022 07:44) (77 - 90)  BP: 112/50 (06 Dec 2022 07:44) (112/50 - 125/60)  BP(mean): --  RR: 16 (06 Dec 2022 07:44) (15 - 16)  SpO2: 99% (06 Dec 2022 07:44) (97% - 99%)    Parameters below as of 06 Dec 2022 07:44  Patient On (Oxygen Delivery Method): room air            PHYSICAL EXAM:  GENERAL: No apparent distress, appears stated age  EYES: Conjunctiva and sclera clear, no discharge  ENMT: Moist mucous membranes, no nasal discharge  CHEST/LUNG: Clear to auscultation bilaterally, no wheeze or rales  HEART: Regular rhythm, no rubs or gallops  ABDOMEN: Soft, Nontender, Nondistended  EXTREMITIES:  No clubbing, cyanosis or edema, TKR dressings  SKIN: No rash, no new discoloration      LABS:      Ca    8.1        05 Dec 2022 07:16          CAPILLARY BLOOD GLUCOSE            RADIOLOGY & ADDITIONAL TESTS:      Images reviewed personally    Consultant Notes Reviewed and Care Discussed with relevant Consultants.

## 2022-12-06 NOTE — PROGRESS NOTE ADULT - ASSESSMENT
72yM with a history of HTN, HLD, OA, who presented to [facility] on 11/30 with complaint of bilateral knee pain and OA who underwent bilateral total knee arthroplasty on 11/30/22. Hospital course supposedly uncomplicated.        Admitted to [place] acute inpatient rehab on 12/2 for ADL, gait, and functional impairments.       #Functional deficits due to OA s/p Bilateral Total Knee Arthroplasty  -b/l TKA 11/30/22  -pain control  -DVT ppx with ASA BID for 6 weeks (till 1/11). Check US LEs r/o DVT for recent fever, LE edema. Apply ice. - DUPLEX DONE - NEG FOR DVT  - Comprehensive Multidisciplinary Rehab Program     3 hours a day, 5 days a week with PT/OT/SLP     P&O as needed    #HTN  -c/w home dose losartan.  - Encouarge PO. BP 110s in therapy standing this am. Monitor closely-hospitalist to adjust.     #HLD  c/w equivalent home dose statin (at home on on Crestor 10mg)    Pain Management:  - Tylenol PRN  -oxycodone PRN  -Ice PRN    GI/Bowel:  - Senna QHS, Miralax daily   - GI ppx: pepcid    /Bladder:   - Encourage timed voids every 4 hours while awake       Skin/Pressure Injury:  - Skin assessment on admission: intact  - Monitor Incisions: Bilateral knee surgical incision with aquacel to be removed 7 days post op (12/7)  Diet:   - Diet Consistency/Modifications: Reg w/ thins - DASH    DVT ppx:  - ASA BID      Restrictions/Precautions:  - Weight bearing status: WBAT  - Precautions: Falls    ---------------  Outpatient Follow-up:    Dr. Jose Angel Marrero  Internal Medicine  [address] [facility] [address]  [address]  [place] [address]  [phone]    Dr. Dick Morelos  Orthopedic Surgery  [address] [facility] [address]  [address]  [place] [address]  [phone]  Follow-up in 3 weeks    --------------

## 2022-12-06 NOTE — PROGRESS NOTE ADULT - SUBJECTIVE AND OBJECTIVE BOX
Cc: bilateral total knee arthroplasty on 11/30/22 with gait difficulties    HPI: NAD overnight, afebrile, denies any new complaints. B/l knees w/edema, on ASA BID- US doppler neg 12/5. Dressings CDI. ICE to b/l knees. Encourage PO for low BPs, Cozaar-parameters added.   Pain controlled, no chest pain, no N/V. No other new ROS  Has been tolerating rehabilitation program.      VITALS  Vital Signs Last 24 Hrs  T(C): 36.8 (06 Dec 2022 07:44), Max: 36.9 (05 Dec 2022 20:22)  T(F): 98.3 (06 Dec 2022 07:44), Max: 98.5 (05 Dec 2022 20:22)  HR: 90 (06 Dec 2022 07:44) (77 - 90)  BP: 112/50 (06 Dec 2022 07:44) (112/50 - 125/60)  BP(mean): --  RR: 16 (06 Dec 2022 07:44) (15 - 16)  SpO2: 99% (06 Dec 2022 07:44) (97% - 99%)    Parameters below as of 06 Dec 2022 07:44  Patient On (Oxygen Delivery Method): room air       RECENT LABS                        8.4    7.50  )-----------( 213      ( 05 Dec 2022 07:16 )             24.9     12-05    137  |  101  |  16  ----------------------------<  112<H>  4.0   |  29  |  1.02    Ca    8.1<L>      05 Dec 2022 07:16    TPro  6.1  /  Alb  2.5<L>  /  TBili  0.9  /  DBili  x   /  AST  33  /  ALT  28  /  AlkPhos  49  12-05      LIVER FUNCTIONS - ( 05 Dec 2022 07:16 )  Alb: 2.5 g/dL / Pro: 6.1 g/dL / ALK PHOS: 49 U/L / ALT: 28 U/L / AST: 33 U/L / GGT: x                   CURRENT MEDICATIONS  MEDICATIONS  (STANDING):  aspirin  chewable 81 milliGRAM(s) Oral every 12 hours  atorvastatin 40 milliGRAM(s) Oral at bedtime  coronavirus bivalent (EUA) Booster Vaccine (PFIZER) 0.3 milliLiter(s) IntraMuscular once  famotidine    Tablet 20 milliGRAM(s) Oral two times a day  ferrous    sulfate 325 milliGRAM(s) Oral daily  losartan 25 milliGRAM(s) Oral daily  senna 2 Tablet(s) Oral at bedtime    MEDICATIONS  (PRN):  acetaminophen     Tablet .. 975 milliGRAM(s) Oral every 8 hours PRN Temp greater or equal to 38C (100.4F), Mild Pain (1 - 3)  oxyCODONE    IR 10 milliGRAM(s) Oral every 4 hours PRN Severe Pain (7 - 10)  oxyCODONE    IR 5 milliGRAM(s) Oral every 4 hours PRN Moderate Pain (4 - 6)  polyethylene glycol 3350 17 Gram(s) Oral daily PRN Constipation      Physical Exam:   Constitutional - NAD, Comfortable  HEENT - NCAT, EOMI  Neck - Supple, No limited ROM  Chest - good chest expansion, good respiratory effort, CTAB   Cardio - warm and well perfused, RRR, no murmur  Abdomen -  Soft, NTND  Extremities - + swelling bilateral knees, No calf tenderness   Neurologic Exam:                    Cognitive -             Orientation: Awake, Alert, AAO to self, place, date, year, situation            Attention at baseline,   Speech - Fluent       Motor -                      LEFT    UE - ShAB 5/5, EF 5/5, EE 5/5, WE 5/5,  WNL                    RIGHT UE - ShAB 5/5, EF 5/5, EE 5/5, WE 5/5,  WNL                    LEFT    LE - HF 5/5, KE not tested due to pain, DF 5/5, PF 5/5 > 90 flexion both knees                    RIGHT LE - HF 5/5, KE not tested due to pain, DF 5/5, PF 5/5        Psychiatric - Mood stable, Affect WNL  Skin on admission: BL surgical incision of the bilateral knees with aquacel dressing    Continue comprehensive acute rehab program consisting of 3hrs/day of OT/PT.      Continue comprehensive acute rehab program consisting of 3hrs/day of OT/PT and SLP.

## 2022-12-06 NOTE — PROGRESS NOTE ADULT - NS ATTEND AMEND GEN_ALL_CORE FT
Rehab Attending- Patient seen and examined by me - Case discussed, above note reviewed by me with modifications made    Doing well  seen in PT Gym  Good Flexion both knees -mildly limited terminal extension  surgical dressings intact- no drainage  labs reviewed by me- stable  Duplex LEs neg for DVT  No further temps since PM 12/2- PANDEY negative  to continue intensive rehab program Rehab Attending- Patient seen and examined by me - Case discussed, above note reviewed by me with modifications made    Doing well  BPs Better  moved Bowels ,voiding  Good Flexion both knees -mildly limited terminal extension  surgical dressings intact- no drainage  Duplex LEs neg for DVT  No further temps  to continue intensive rehab program

## 2022-12-06 NOTE — PROGRESS NOTE ADULT - ASSESSMENT
73 yo man with history of HTN, HLD, Rotator Cuff Tear (s/p Bilateral Repairs: Left 05/29/2013; Right: 08/28/2019), DDD (s/p Lumbar Laminectomy 1986) and Osteoarthritis admitted to  after hospital course at [facility] for bilateral total knee replacement.     Physical Debility due to B/L TKR  - Comprehensive rehab as per primary team  - Bowel/Pain regimen as per primary team    Hyperlipidemia   - Continue atorvastatin    Normocytic Anemia likely MARTIN and post op anemia  - Continue iron supplements BID    Hypertension  - Continue losartan  - Monitor vital signs    DVT Prophylaxis with aspirin twice a day per ortho protocol.

## 2022-12-07 PROCEDURE — 99232 SBSQ HOSP IP/OBS MODERATE 35: CPT | Mod: GC

## 2022-12-07 RX ADMIN — OXYCODONE HYDROCHLORIDE 5 MILLIGRAM(S): 5 TABLET ORAL at 13:15

## 2022-12-07 RX ADMIN — FAMOTIDINE 20 MILLIGRAM(S): 10 INJECTION INTRAVENOUS at 17:48

## 2022-12-07 RX ADMIN — OXYCODONE HYDROCHLORIDE 5 MILLIGRAM(S): 5 TABLET ORAL at 06:04

## 2022-12-07 RX ADMIN — OXYCODONE HYDROCHLORIDE 5 MILLIGRAM(S): 5 TABLET ORAL at 18:40

## 2022-12-07 RX ADMIN — SENNA PLUS 2 TABLET(S): 8.6 TABLET ORAL at 21:27

## 2022-12-07 RX ADMIN — POLYETHYLENE GLYCOL 3350 17 GRAM(S): 17 POWDER, FOR SOLUTION ORAL at 17:48

## 2022-12-07 RX ADMIN — Medication 325 MILLIGRAM(S): at 11:42

## 2022-12-07 RX ADMIN — ATORVASTATIN CALCIUM 40 MILLIGRAM(S): 80 TABLET, FILM COATED ORAL at 21:29

## 2022-12-07 RX ADMIN — OXYCODONE HYDROCHLORIDE 5 MILLIGRAM(S): 5 TABLET ORAL at 00:40

## 2022-12-07 RX ADMIN — FAMOTIDINE 20 MILLIGRAM(S): 10 INJECTION INTRAVENOUS at 06:00

## 2022-12-07 RX ADMIN — OXYCODONE HYDROCHLORIDE 5 MILLIGRAM(S): 5 TABLET ORAL at 12:18

## 2022-12-07 RX ADMIN — Medication 975 MILLIGRAM(S): at 12:12

## 2022-12-07 RX ADMIN — Medication 81 MILLIGRAM(S): at 05:59

## 2022-12-07 RX ADMIN — Medication 81 MILLIGRAM(S): at 17:48

## 2022-12-07 RX ADMIN — OXYCODONE HYDROCHLORIDE 5 MILLIGRAM(S): 5 TABLET ORAL at 01:40

## 2022-12-07 RX ADMIN — Medication 1 TABLET(S): at 20:17

## 2022-12-07 RX ADMIN — Medication 975 MILLIGRAM(S): at 11:42

## 2022-12-07 RX ADMIN — OXYCODONE HYDROCHLORIDE 5 MILLIGRAM(S): 5 TABLET ORAL at 17:48

## 2022-12-07 NOTE — PROGRESS NOTE ADULT - SUBJECTIVE AND OBJECTIVE BOX
Cc: bilateral total knee arthroplasty on 11/30/22 with gait difficulties    HPI: NAD overnight, afebrile, denies any new complaints. B/l knees w/edema, on ASA BID- US doppler neg 12/5. Dressings CDI. ICE to b/l knees. Encourage PO for low BPs, Cozaar-parameters added.   Pain controlled, no chest pain, no N/V. No other new ROS  Has been tolerating rehabilitation program.      VITALS  Vital Signs Last 24 Hrs  T(C): 37 (07 Dec 2022 07:23), Max: 37.1 (07 Dec 2022 06:00)  T(F): 98.6 (07 Dec 2022 07:23), Max: 98.7 (07 Dec 2022 06:00)  HR: 72 (07 Dec 2022 07:23) (72 - 87)  BP: 100/63 (07 Dec 2022 07:23) (100/63 - 114/66)  BP(mean): --  RR: 16 (07 Dec 2022 07:23) (16 - 17)  SpO2: 97% (07 Dec 2022 07:23) (96% - 97%)    Parameters below as of 07 Dec 2022 07:23  Patient On (Oxygen Delivery Method): room air           RECENT LABS                        8.4    7.50  )-----------( 213      ( 05 Dec 2022 07:16 )             24.9     12-05    137  |  101  |  16  ----------------------------<  112<H>  4.0   |  29  |  1.02    Ca    8.1<L>      05 Dec 2022 07:16    TPro  6.1  /  Alb  2.5<L>  /  TBili  0.9  /  DBili  x   /  AST  33  /  ALT  28  /  AlkPhos  49  12-05      LIVER FUNCTIONS - ( 05 Dec 2022 07:16 )  Alb: 2.5 g/dL / Pro: 6.1 g/dL / ALK PHOS: 49 U/L / ALT: 28 U/L / AST: 33 U/L / GGT: x                   MEDICATIONS  (STANDING):  aspirin  chewable 81 milliGRAM(s) Oral every 12 hours  atorvastatin 40 milliGRAM(s) Oral at bedtime  famotidine    Tablet 20 milliGRAM(s) Oral two times a day  ferrous    sulfate 325 milliGRAM(s) Oral daily  losartan 25 milliGRAM(s) Oral daily  senna 2 Tablet(s) Oral at bedtime    MEDICATIONS  (PRN):  acetaminophen     Tablet .. 975 milliGRAM(s) Oral every 8 hours PRN Temp greater or equal to 38C (100.4F), Mild Pain (1 - 3)  oxyCODONE    IR 5 milliGRAM(s) Oral every 4 hours PRN Moderate Pain (4 - 6)  oxyCODONE    IR 10 milliGRAM(s) Oral every 4 hours PRN Severe Pain (7 - 10)  polyethylene glycol 3350 17 Gram(s) Oral daily PRN Constipation        Physical Exam:   Constitutional - NAD, Comfortable  HEENT - NCAT, EOMI  Neck - Supple, No limited ROM  Chest - good chest expansion, good respiratory effort, CTAB   Cardio - warm and well perfused, RRR, no murmur  Abdomen -  Soft, NTND  Extremities - + swelling bilateral knees, No calf tenderness   Neurologic Exam:                    Cognitive -             Orientation: Awake, Alert, AAO to self, place, date, year, situation            Attention at baseline,   Speech - Fluent       Motor -                      LEFT    UE - ShAB 5/5, EF 5/5, EE 5/5, WE 5/5,  WNL                    RIGHT UE - ShAB 5/5, EF 5/5, EE 5/5, WE 5/5,  WNL                    LEFT    LE - HF 5/5, KE not tested due to pain, DF 5/5, PF 5/5 > 90 flexion both knees                    RIGHT LE - HF 5/5, KE not tested due to pain, DF 5/5, PF 5/5        Psychiatric - Mood stable, Affect WNL  Skin on admission: BL surgical incision of the bilateral knees - dressing DCd- staples intact - incision dry    Continue comprehensive acute rehab program consisting of 3hrs/day of OT/PT.      Continue comprehensive acute rehab program consisting of 3hrs/day of OT/PT and SLP.

## 2022-12-07 NOTE — PROGRESS NOTE ADULT - ASSESSMENT
72yM with a history of HTN, HLD, OA, who presented to [facility] on 11/30 with complaint of bilateral knee pain and OA who underwent bilateral total knee arthroplasty on 11/30/22. Hospital course supposedly uncomplicated.        Admitted to [facility] acute inpatient rehab on 12/2 for ADL, gait, and functional impairments.       #Functional deficits due to OA s/p Bilateral Total Knee Arthroplasty  -b/l TKA 11/30/22  -pain control  -DVT ppx with ASA BID for 6 weeks (till 1/11). Check US LEs r/o DVT for recent fever, LE edema. Apply ice. - DUPLEX DONE - NEG FOR DVT  - Comprehensive Multidisciplinary Rehab Program     3 hours a day, 5 days a week with PT/OT/SLP     P&O as needed    #HTN  -c/w home dose losartan.  - Encouarge PO. BPs stable. Monitor closely-hospitalist to adjust.     #HLD  c/w equivalent home dose statin (at home on on Crestor 10mg)    Pain Management:  - Tylenol PRN  -oxycodone PRN  -Ice PRN    GI/Bowel:  - Senna QHS, Miralax daily   - GI ppx: pepcid    /Bladder:   - Encourage timed voids every 4 hours while awake       Skin/Pressure Injury:  - Skin assessment on admission: intact  - Monitor Incisions: Bilateral knee surgical incision with aquacel to be removed 7 days post op (12/7)  Diet:   - Diet Consistency/Modifications: Reg w/ thins - DASH    DVT ppx:  - ASA BID      Restrictions/Precautions:  - Weight bearing status: WBAT  - Precautions: Falls    ---------------  Outpatient Follow-up:    Dr. Jose Angel Marrero  Internal Medicine  [address]  [address]  [place], [place] [id number] [phone]    Dr. Dick Morelos  Orthopedic Surgery  [address]  [address]  [place] [address]  [phone]  Follow-up in 3 weeks    --------------

## 2022-12-08 LAB
ALBUMIN SERPL ELPH-MCNC: 2.6 G/DL — LOW (ref 3.3–5)
ALP SERPL-CCNC: 57 U/L — SIGNIFICANT CHANGE UP (ref 40–120)
ALT FLD-CCNC: 44 U/L — SIGNIFICANT CHANGE UP (ref 10–45)
ANION GAP SERPL CALC-SCNC: 5 MMOL/L — SIGNIFICANT CHANGE UP (ref 5–17)
AST SERPL-CCNC: 34 U/L — SIGNIFICANT CHANGE UP (ref 10–40)
BASOPHILS # BLD AUTO: 0.04 K/UL — SIGNIFICANT CHANGE UP (ref 0–0.2)
BASOPHILS NFR BLD AUTO: 0.4 % — SIGNIFICANT CHANGE UP (ref 0–2)
BILIRUB SERPL-MCNC: 1.1 MG/DL — SIGNIFICANT CHANGE UP (ref 0.2–1.2)
BUN SERPL-MCNC: 13 MG/DL — SIGNIFICANT CHANGE UP (ref 7–23)
CALCIUM SERPL-MCNC: 8.7 MG/DL — SIGNIFICANT CHANGE UP (ref 8.4–10.5)
CHLORIDE SERPL-SCNC: 102 MMOL/L — SIGNIFICANT CHANGE UP (ref 96–108)
CO2 SERPL-SCNC: 29 MMOL/L — SIGNIFICANT CHANGE UP (ref 22–31)
CREAT SERPL-MCNC: 0.99 MG/DL — SIGNIFICANT CHANGE UP (ref 0.5–1.3)
EGFR: 81 ML/MIN/1.73M2 — SIGNIFICANT CHANGE UP
EOSINOPHIL # BLD AUTO: 0.06 K/UL — SIGNIFICANT CHANGE UP (ref 0–0.5)
EOSINOPHIL NFR BLD AUTO: 0.6 % — SIGNIFICANT CHANGE UP (ref 0–6)
GLUCOSE SERPL-MCNC: 116 MG/DL — HIGH (ref 70–99)
HCT VFR BLD CALC: 24.8 % — LOW (ref 39–50)
HGB BLD-MCNC: 8.2 G/DL — LOW (ref 13–17)
IMM GRANULOCYTES NFR BLD AUTO: 1.2 % — HIGH (ref 0–0.9)
LYMPHOCYTES # BLD AUTO: 2.06 K/UL — SIGNIFICANT CHANGE UP (ref 1–3.3)
LYMPHOCYTES # BLD AUTO: 22.2 % — SIGNIFICANT CHANGE UP (ref 13–44)
MCHC RBC-ENTMCNC: 30.1 PG — SIGNIFICANT CHANGE UP (ref 27–34)
MCHC RBC-ENTMCNC: 33.1 GM/DL — SIGNIFICANT CHANGE UP (ref 32–36)
MCV RBC AUTO: 91.2 FL — SIGNIFICANT CHANGE UP (ref 80–100)
MONOCYTES # BLD AUTO: 0.92 K/UL — HIGH (ref 0–0.9)
MONOCYTES NFR BLD AUTO: 9.9 % — SIGNIFICANT CHANGE UP (ref 2–14)
NEUTROPHILS # BLD AUTO: 6.09 K/UL — SIGNIFICANT CHANGE UP (ref 1.8–7.4)
NEUTROPHILS NFR BLD AUTO: 65.7 % — SIGNIFICANT CHANGE UP (ref 43–77)
NRBC # BLD: 0 /100 WBCS — SIGNIFICANT CHANGE UP (ref 0–0)
PLATELET # BLD AUTO: 355 K/UL — SIGNIFICANT CHANGE UP (ref 150–400)
POTASSIUM SERPL-MCNC: 4.1 MMOL/L — SIGNIFICANT CHANGE UP (ref 3.5–5.3)
POTASSIUM SERPL-SCNC: 4.1 MMOL/L — SIGNIFICANT CHANGE UP (ref 3.5–5.3)
PROT SERPL-MCNC: 6.2 G/DL — SIGNIFICANT CHANGE UP (ref 6–8.3)
RBC # BLD: 2.72 M/UL — LOW (ref 4.2–5.8)
RBC # FLD: 12.8 % — SIGNIFICANT CHANGE UP (ref 10.3–14.5)
SODIUM SERPL-SCNC: 136 MMOL/L — SIGNIFICANT CHANGE UP (ref 135–145)
WBC # BLD: 9.28 K/UL — SIGNIFICANT CHANGE UP (ref 3.8–10.5)
WBC # FLD AUTO: 9.28 K/UL — SIGNIFICANT CHANGE UP (ref 3.8–10.5)

## 2022-12-08 PROCEDURE — 99232 SBSQ HOSP IP/OBS MODERATE 35: CPT | Mod: GC

## 2022-12-08 PROCEDURE — 99232 SBSQ HOSP IP/OBS MODERATE 35: CPT

## 2022-12-08 RX ORDER — LIDOCAINE 4 G/100G
2 CREAM TOPICAL
Refills: 0 | Status: DISCONTINUED | OUTPATIENT
Start: 2022-12-08 | End: 2022-12-15

## 2022-12-08 RX ORDER — OXYCODONE HYDROCHLORIDE 5 MG/1
10 TABLET ORAL EVERY 6 HOURS
Refills: 0 | Status: DISCONTINUED | OUTPATIENT
Start: 2022-12-08 | End: 2022-12-15

## 2022-12-08 RX ORDER — OXYCODONE HYDROCHLORIDE 5 MG/1
5 TABLET ORAL EVERY 6 HOURS
Refills: 0 | Status: DISCONTINUED | OUTPATIENT
Start: 2022-12-08 | End: 2022-12-15

## 2022-12-08 RX ADMIN — OXYCODONE HYDROCHLORIDE 5 MILLIGRAM(S): 5 TABLET ORAL at 14:35

## 2022-12-08 RX ADMIN — LIDOCAINE 2 PATCH: 4 CREAM TOPICAL at 19:35

## 2022-12-08 RX ADMIN — OXYCODONE HYDROCHLORIDE 5 MILLIGRAM(S): 5 TABLET ORAL at 11:04

## 2022-12-08 RX ADMIN — SENNA PLUS 2 TABLET(S): 8.6 TABLET ORAL at 21:24

## 2022-12-08 RX ADMIN — FAMOTIDINE 20 MILLIGRAM(S): 10 INJECTION INTRAVENOUS at 05:55

## 2022-12-08 RX ADMIN — OXYCODONE HYDROCHLORIDE 5 MILLIGRAM(S): 5 TABLET ORAL at 10:34

## 2022-12-08 RX ADMIN — Medication 81 MILLIGRAM(S): at 05:55

## 2022-12-08 RX ADMIN — Medication 1 TABLET(S): at 11:46

## 2022-12-08 RX ADMIN — Medication 325 MILLIGRAM(S): at 11:46

## 2022-12-08 RX ADMIN — ATORVASTATIN CALCIUM 40 MILLIGRAM(S): 80 TABLET, FILM COATED ORAL at 21:24

## 2022-12-08 RX ADMIN — FAMOTIDINE 20 MILLIGRAM(S): 10 INJECTION INTRAVENOUS at 17:48

## 2022-12-08 RX ADMIN — OXYCODONE HYDROCHLORIDE 5 MILLIGRAM(S): 5 TABLET ORAL at 19:18

## 2022-12-08 RX ADMIN — OXYCODONE HYDROCHLORIDE 5 MILLIGRAM(S): 5 TABLET ORAL at 19:48

## 2022-12-08 RX ADMIN — OXYCODONE HYDROCHLORIDE 5 MILLIGRAM(S): 5 TABLET ORAL at 14:05

## 2022-12-08 RX ADMIN — Medication 81 MILLIGRAM(S): at 17:48

## 2022-12-08 NOTE — PROGRESS NOTE ADULT - NS ATTEND AMEND GEN_ALL_CORE FT
Rehab Attending- Patient seen and examined by me - Case discussed, above note reviewed by me with modifications made    Doing well   ROM approx both knees  labs reviewed by me- ceefrino  discussed in team conference- tentative DC  12/15  to continue intensive rehab program

## 2022-12-08 NOTE — PROGRESS NOTE ADULT - ASSESSMENT
71 yo man with history of HTN, HLD, Rotator Cuff Tear (s/p Bilateral Repairs: Left 05/29/2013; Right: 08/28/2019), DDD (s/p Lumbar Laminectomy 1986) and Osteoarthritis admitted to  after hospital course at [facility] for bilateral total knee replacement.     Physical Debility due to B/L TKR  - Comprehensive rehab as per primary team  - Bowel/Pain regimen as per primary team    Hyperlipidemia   - Continue atorvastatin    Normocytic Anemia likely MARTIN and post op anemia  - Continue iron supplements BID    Hypertension  Blood pressure meds with hold parameters     DVT Prophylaxis with aspirin twice a day per ortho protocol.

## 2022-12-08 NOTE — PROGRESS NOTE ADULT - ASSESSMENT
72yM with a history of HTN, HLD, OA, who presented to [facility] on 11/30 with complaint of bilateral knee pain and OA who underwent bilateral total knee arthroplasty on 11/30/22. Hospital course supposedly uncomplicated.        Admitted to [facility] acute inpatient rehab on 12/2 for ADL, gait, and functional impairments.       #Functional deficits due to OA s/p Bilateral Total Knee Arthroplasty  -b/l TKA 11/30/22  -pain control  -DVT ppx with ASA BID for 6 weeks (till 1/11). US LEs r/o DVT for recent fever, LE edema. Apply ice. - DUPLEX DONE - NEG FOR DVT  - Comprehensive Multidisciplinary Rehab Program     3 hours a day, 5 days a week with PT/OT/SLP     P&O as needed    #HTN  -c/w home dose losartan.  - Encouarge PO. BPs borderline. Monitor closely-hospitalist to adjust.     #HLD  c/w equivalent home dose statin (at home on on Crestor 10mg)    Pain Management:  - Tylenol PRN  -oxycodone PRN  -Ice PRN    GI/Bowel:  - Senna QHS, Miralax daily   - GI ppx: pepcid    /Bladder:   - Encourage timed voids every 4 hours while awake       Skin/Pressure Injury:  - Skin assessment on admission: intact  - Monitor Incisions: Bilateral knee surgical incision with aquacel to be removed 7 days post op (12/7)  Diet:   - Diet Consistency/Modifications: Reg w/ thins - DASH    DVT ppx:  - ASA BID      Restrictions/Precautions:  - Weight bearing status: WBAT  - Precautions: Falls    ---------------  Outpatient Follow-up:    Dr. Jose Angel Marrero  Internal Medicine  [address]  [address]  [place] [address]  [phone]    Dr. Dick Morelos  Orthopedic Surgery  [address]  [address]  [place] [address]  [phone]  Follow-up in 3 weeks    --------------

## 2022-12-08 NOTE — PROGRESS NOTE ADULT - SUBJECTIVE AND OBJECTIVE BOX
Cc: bilateral total knee arthroplasty on 11/30/22 with gait difficulties    HPI: NAD overnight, afebrile, denies any new complaints. B/l knees w/edema, on ASA BID- US doppler neg 12/5. Dressings CDI. ICE to b/l knees. Encourage PO for low BPs, Cozaar-parameters added-BP borderline-held this am   Pain controlled, no chest pain, no N/V. No other new ROS  Has been tolerating rehabilitation program.        VITALS  Vital Signs Last 24 Hrs  T(C): 36.6 (08 Dec 2022 08:55), Max: 36.9 (07 Dec 2022 20:29)  T(F): 97.9 (08 Dec 2022 08:55), Max: 98.4 (07 Dec 2022 20:29)  HR: 73 (08 Dec 2022 08:55) (73 - 90)  BP: 117/77 (08 Dec 2022 08:55) (109/64 - 117/77)  BP(mean): --  RR: 16 (08 Dec 2022 08:55) (16 - 16)  SpO2: 98% (08 Dec 2022 08:55) (98% - 98%)    Parameters below as of 08 Dec 2022 08:55  Patient On (Oxygen Delivery Method): room air      RECENT LABS                        8.2    9.28  )-----------( 355      ( 08 Dec 2022 06:20 )             24.8     12-08    136  |  102  |  13  ----------------------------<  116<H>  4.1   |  29  |  0.99    Ca    8.7      08 Dec 2022 06:20    TPro  6.2  /  Alb  2.6<L>  /  TBili  1.1  /  DBili  x   /  AST  34  /  ALT  44  /  AlkPhos  57  12-08      LIVER FUNCTIONS - ( 08 Dec 2022 06:20 )  Alb: 2.6 g/dL / Pro: 6.2 g/dL / ALK PHOS: 57 U/L / ALT: 44 U/L / AST: 34 U/L / GGT: x             Physical Exam:   Constitutional - NAD, Comfortable  HEENT - NCAT, EOMI  Neck - Supple, No limited ROM  Chest - good chest expansion, good respiratory effort, CTAB   Cardio - warm and well perfused, RRR, no murmur  Abdomen -  Soft, NTND  Extremities - + swelling bilateral knees, No calf tenderness   Neurologic Exam:                    Cognitive -             Orientation: Awake, Alert, AAO to self, place, date, year, situation            Attention at baseline,   Speech - Fluent       Motor -                      LEFT    UE - ShAB 5/5, EF 5/5, EE 5/5, WE 5/5,  WNL                    RIGHT UE - ShAB 5/5, EF 5/5, EE 5/5, WE 5/5,  WNL                    LEFT    LE - HF 5/5, KE not tested due to pain, DF 5/5, PF 5/5 > 90 flexion both knees                    RIGHT LE - HF 5/5, KE not tested due to pain, DF 5/5, PF 5/5        Psychiatric - Mood stable, Affect WNL  Skin on admission: BL surgical incision of the bilateral knees - dressing DCd- staples intact - incision dry    Continue comprehensive acute rehab program consisting of 3hrs/day of OT/PT.          CURRENT MEDICATIONS  MEDICATIONS  (STANDING):  aspirin  chewable 81 milliGRAM(s) Oral every 12 hours  atorvastatin 40 milliGRAM(s) Oral at bedtime  famotidine    Tablet 20 milliGRAM(s) Oral two times a day  ferrous    sulfate 325 milliGRAM(s) Oral daily  losartan 25 milliGRAM(s) Oral daily  multivitamin 1 Tablet(s) Oral daily  senna 2 Tablet(s) Oral at bedtime    MEDICATIONS  (PRN):  acetaminophen     Tablet .. 975 milliGRAM(s) Oral every 8 hours PRN Temp greater or equal to 38C (100.4F), Mild Pain (1 - 3)  oxyCODONE    IR 10 milliGRAM(s) Oral every 4 hours PRN Severe Pain (7 - 10)  oxyCODONE    IR 5 milliGRAM(s) Oral every 4 hours PRN Moderate Pain (4 - 6)  polyethylene glycol 3350 17 Gram(s) Oral daily PRN Constipation      Continue comprehensive acute rehab program consisting of 3hrs/day of OT/PT. Cc: bilateral total knee arthroplasty on 11/30/22 with gait difficulties    HPI: NAD overnight, afebrile, denies any new complaints. B/l knees w/edema, on ASA BID- US doppler neg 12/5. Dressings CDI. ICE to b/l knees. Encourage PO for low BPs, Cozaar-parameters added-BP borderline-held this am   Pain controlled, no chest pain, no N/V. No other new ROS  Has been tolerating rehabilitation program.        VITALS  Vital Signs Last 24 Hrs  T(C): 36.6 (08 Dec 2022 08:55), Max: 36.9 (07 Dec 2022 20:29)  T(F): 97.9 (08 Dec 2022 08:55), Max: 98.4 (07 Dec 2022 20:29)  HR: 73 (08 Dec 2022 08:55) (73 - 90)  BP: 117/77 (08 Dec 2022 08:55) (109/64 - 117/77)  BP(mean): --  RR: 16 (08 Dec 2022 08:55) (16 - 16)  SpO2: 98% (08 Dec 2022 08:55) (98% - 98%)    Parameters below as of 08 Dec 2022 08:55  Patient On (Oxygen Delivery Method): room air      RECENT LABS                        8.2    9.28  )-----------( 355      ( 08 Dec 2022 06:20 )             24.8     12-08    136  |  102  |  13  ----------------------------<  116<H>  4.1   |  29  |  0.99    Ca    8.7      08 Dec 2022 06:20    TPro  6.2  /  Alb  2.6<L>  /  TBili  1.1  /  DBili  x   /  AST  34  /  ALT  44  /  AlkPhos  57  12-08      LIVER FUNCTIONS - ( 08 Dec 2022 06:20 )  Alb: 2.6 g/dL / Pro: 6.2 g/dL / ALK PHOS: 57 U/L / ALT: 44 U/L / AST: 34 U/L / GGT: x             Physical Exam:   Constitutional - NAD, Comfortable  HEENT - NCAT, EOMI  Neck - Supple, No limited ROM  Chest - good chest expansion, good respiratory effort, CTAB   Cardio - warm and well perfused, RRR, no murmur  Abdomen -  Soft, NTND  Extremities - + swelling bilateral knees, No calf tenderness   Neurologic Exam:                    Cognitive -             Orientation: Awake, Alert, AAO to self, place, date, year, situation            Attention at baseline,   Speech - Fluent       Motor -                      LEFT    UE - ShAB 5/5, EF 5/5, EE 5/5, WE 5/5,  WNL                    RIGHT UE - ShAB 5/5, EF 5/5, EE 5/5, WE 5/5,  WNL                    LEFT    LE - HF 5/5, KE not tested due to pain, DF 5/5, PF 5/5 > 90 flexion both knees                    RIGHT LE - HF 5/5, KE not tested due to pain, DF 5/5, PF 5/5        Psychiatric - Mood stable, Affect WNL  Skin on admission: BL surgical incision of the bilateral knees - dressing DCd- staples intact - incision dry      CURRENT MEDICATIONS  MEDICATIONS  (STANDING):  aspirin  chewable 81 milliGRAM(s) Oral every 12 hours  atorvastatin 40 milliGRAM(s) Oral at bedtime  famotidine    Tablet 20 milliGRAM(s) Oral two times a day  ferrous    sulfate 325 milliGRAM(s) Oral daily  losartan 25 milliGRAM(s) Oral daily  multivitamin 1 Tablet(s) Oral daily  senna 2 Tablet(s) Oral at bedtime    MEDICATIONS  (PRN):  acetaminophen     Tablet .. 975 milliGRAM(s) Oral every 8 hours PRN Temp greater or equal to 38C (100.4F), Mild Pain (1 - 3)  oxyCODONE    IR 10 milliGRAM(s) Oral every 4 hours PRN Severe Pain (7 - 10)  oxyCODONE    IR 5 milliGRAM(s) Oral every 4 hours PRN Moderate Pain (4 - 6)  polyethylene glycol 3350 17 Gram(s) Oral daily PRN Constipation    IDT meeting on 12/8    SW: APT 16STE    OT:  eating CS  grooming CS  UBD/LBD CS  toileting CS  tub/shower Cs  bathing    PT:  amb 80ft CG RW  transfers CG  stairs 8st min A    SLP na    tentative dc home c HC 12/15     Continue comprehensive acute rehab program consisting of 3hrs/day of OT/PT.

## 2022-12-08 NOTE — CHART NOTE - NSCHARTNOTEFT_GEN_A_CORE
Nutrition Follow Up Note  Hospital Course   (Per Electronic Medical Record)    Source:  Patient [X]  Medical Record [X]      Diet:   Regular Diet (IDDSI Level 7) w/ Thin Liquids (IDDSI Level 0)  Tolerates Diet Consistency Well  No Chewing/Swallowing Difficulties  No Recent Nausea, Vomiting, Diarrhea or Constipation (as Per Patient)  Consumes % of Meals (as Per Documentation) - States Good PO Intake/Appetite (Per Patient)   Declines Nutrition Supplementation  Obtained Food Preferences from Patient  Family Brings in Food from Home    Enteral/Parenteral Nutrition: Not Applicable    Current Weight: 188.7lb on 12/4  Obtain New Weight to Confirm  Obtain Weights Weekly     Pertinent Medications: MEDICATIONS  (STANDING):  aspirin  chewable 81 milliGRAM(s) Oral every 12 hours  atorvastatin 40 milliGRAM(s) Oral at bedtime  famotidine    Tablet 20 milliGRAM(s) Oral two times a day  ferrous    sulfate 325 milliGRAM(s) Oral daily  losartan 25 milliGRAM(s) Oral daily  multivitamin 1 Tablet(s) Oral daily  senna 2 Tablet(s) Oral at bedtime    MEDICATIONS  (PRN):  acetaminophen     Tablet .. 975 milliGRAM(s) Oral every 8 hours PRN Temp greater or equal to 38C (100.4F), Mild Pain (1 - 3)  oxyCODONE    IR 10 milliGRAM(s) Oral every 4 hours PRN Severe Pain (7 - 10)  oxyCODONE    IR 5 milliGRAM(s) Oral every 4 hours PRN Moderate Pain (4 - 6)  polyethylene glycol 3350 17 Gram(s) Oral daily PRN Constipation    Pertinent Labs:  12-08 Na136 mmol/L Glu 116 mg/dL<H> K+ 4.1 mmol/L Cr  0.99 mg/dL BUN 13 mg/dL 12-08 Alb 2.6 g/dL<L>    Skin: No Pressure Ulcers  Multiple Surgical Incisions  (as Per Nursing Flow Sheet)     Edema: +1 Edema Noted to Bilateral Knees  (as Per Documentation)     Last Bowel Movement: on 12/5    Estimated Needs:   [X] No Change Since Previous Assessment    Previous Nutrition Diagnosis:   Moderate Malnutrition     Nutrition Diagnosis is [X] Ongoing - Declines Nutrition Supplementation     New Nutrition Diagnosis: [X] Not Applicable    Interventions:   1. Recommend Continue Nutrition Plan of Care     Monitoring & Evaluation:   [X] Weights   [X] PO Intake   [X] Skin Integrity   [X] Follow Up (Per Protocol)  [X] Tolerance to Diet Prescription   [X] Other: Labs     Registered Dietitian/Nutritionist Remains Available.  Quan Martinez, DEEPTHIN    Phone# [phone]

## 2022-12-08 NOTE — PROGRESS NOTE ADULT - SUBJECTIVE AND OBJECTIVE BOX
Patient is a 72y old  Male who presents with a chief complaint of OA s/p B/L TKA (07 Dec 2022 15:57)      INTERVAL History of Present Illness/OVERNIGHT EVENTS: participating in PT/OT     MEDICATIONS  (STANDING):  aspirin  chewable 81 milliGRAM(s) Oral every 12 hours  atorvastatin 40 milliGRAM(s) Oral at bedtime  famotidine    Tablet 20 milliGRAM(s) Oral two times a day  ferrous    sulfate 325 milliGRAM(s) Oral daily  losartan 25 milliGRAM(s) Oral daily  multivitamin 1 Tablet(s) Oral daily  senna 2 Tablet(s) Oral at bedtime    MEDICATIONS  (PRN):  acetaminophen     Tablet .. 975 milliGRAM(s) Oral every 8 hours PRN Temp greater or equal to 38C (100.4F), Mild Pain (1 - 3)  oxyCODONE    IR 10 milliGRAM(s) Oral every 4 hours PRN Severe Pain (7 - 10)  oxyCODONE    IR 5 milliGRAM(s) Oral every 4 hours PRN Moderate Pain (4 - 6)  polyethylene glycol 3350 17 Gram(s) Oral daily PRN Constipation      Allergies    No Known Allergies    Intolerances        REVIEW OF SYSTEMS:  Other systems currently negative unless otherwise specified above.    Vital Signs Last 24 Hrs  T(C): 36.6 (08 Dec 2022 08:55), Max: 36.9 (07 Dec 2022 20:29)  T(F): 97.9 (08 Dec 2022 08:55), Max: 98.4 (07 Dec 2022 20:29)  HR: 73 (08 Dec 2022 08:55) (73 - 90)  BP: 117/77 (08 Dec 2022 08:55) (109/64 - 117/77)  BP(mean): --  RR: 16 (08 Dec 2022 08:55) (16 - 16)  SpO2: 98% (08 Dec 2022 08:55) (98% - 98%)    Parameters below as of 08 Dec 2022 08:55  Patient On (Oxygen Delivery Method): room air            PHYSICAL EXAM:  GENERAL: No apparent distress, appears stated age  EYES: Conjunctiva and sclera clear, no discharge  ENMT: Moist mucous membranes, no nasal discharge  CHEST/LUNG: Clear to auscultation bilaterally, no wheeze or rales  HEART: Regular rhythm, no rubs or gallops  ABDOMEN: Soft, Nontender, Nondistended  EXTREMITIES:  No clubbing, cyanosis or edema  SKIN: b/l TKR staples      LABS:                        8.2    9.28  )-----------( 355      ( 08 Dec 2022 06:20 )             24.8     08 Dec 2022 06:20    136    |  102    |  13     ----------------------------<  116    4.1     |  29     |  0.99     Ca    8.7        08 Dec 2022 06:20    TPro  6.2    /  Alb  2.6    /  TBili  1.1    /  DBili  x      /  AST  34     /  ALT  44     /  AlkPhos  57     08 Dec 2022 06:20        CAPILLARY BLOOD GLUCOSE            RADIOLOGY & ADDITIONAL TESTS:      Images reviewed personally    Consultant Notes Reviewed and Care Discussed with relevant Consultants.

## 2022-12-09 LAB
CULTURE RESULTS: SIGNIFICANT CHANGE UP
SPECIMEN SOURCE: SIGNIFICANT CHANGE UP
VASOPRESSIN SERPL-MCNC: 1.4 PG/ML — SIGNIFICANT CHANGE UP (ref 0–4.7)

## 2022-12-09 PROCEDURE — 99232 SBSQ HOSP IP/OBS MODERATE 35: CPT | Mod: GC

## 2022-12-09 PROCEDURE — 99232 SBSQ HOSP IP/OBS MODERATE 35: CPT

## 2022-12-09 RX ADMIN — OXYCODONE HYDROCHLORIDE 5 MILLIGRAM(S): 5 TABLET ORAL at 07:40

## 2022-12-09 RX ADMIN — Medication 1 TABLET(S): at 11:56

## 2022-12-09 RX ADMIN — Medication 81 MILLIGRAM(S): at 17:43

## 2022-12-09 RX ADMIN — FAMOTIDINE 20 MILLIGRAM(S): 10 INJECTION INTRAVENOUS at 05:25

## 2022-12-09 RX ADMIN — Medication 975 MILLIGRAM(S): at 07:41

## 2022-12-09 RX ADMIN — Medication 81 MILLIGRAM(S): at 05:24

## 2022-12-09 RX ADMIN — OXYCODONE HYDROCHLORIDE 5 MILLIGRAM(S): 5 TABLET ORAL at 15:25

## 2022-12-09 RX ADMIN — OXYCODONE HYDROCHLORIDE 5 MILLIGRAM(S): 5 TABLET ORAL at 08:40

## 2022-12-09 RX ADMIN — Medication 325 MILLIGRAM(S): at 11:56

## 2022-12-09 RX ADMIN — Medication 975 MILLIGRAM(S): at 08:40

## 2022-12-09 RX ADMIN — LIDOCAINE 2 PATCH: 4 CREAM TOPICAL at 07:35

## 2022-12-09 RX ADMIN — LIDOCAINE 2 PATCH: 4 CREAM TOPICAL at 07:18

## 2022-12-09 RX ADMIN — FAMOTIDINE 20 MILLIGRAM(S): 10 INJECTION INTRAVENOUS at 17:43

## 2022-12-09 RX ADMIN — Medication 975 MILLIGRAM(S): at 16:40

## 2022-12-09 RX ADMIN — SENNA PLUS 2 TABLET(S): 8.6 TABLET ORAL at 21:43

## 2022-12-09 RX ADMIN — OXYCODONE HYDROCHLORIDE 5 MILLIGRAM(S): 5 TABLET ORAL at 16:25

## 2022-12-09 RX ADMIN — Medication 975 MILLIGRAM(S): at 15:44

## 2022-12-09 RX ADMIN — ATORVASTATIN CALCIUM 40 MILLIGRAM(S): 80 TABLET, FILM COATED ORAL at 21:43

## 2022-12-09 RX ADMIN — LIDOCAINE 2 PATCH: 4 CREAM TOPICAL at 18:41

## 2022-12-09 NOTE — PROGRESS NOTE ADULT - ASSESSMENT
72yM with a history of HTN, HLD, OA, who presented to [facility] on 11/30 with complaint of bilateral knee pain and OA who underwent bilateral total knee arthroplasty on 11/30/22. Hospital course supposedly uncomplicated.        Admitted to [place] acute inpatient rehab on 12/2 for ADL, gait, and functional impairments.       #Functional deficits due to OA s/p Bilateral Total Knee Arthroplasty  -b/l TKA 11/30/22  -pain control  -DVT ppx with ASA BID for 6 weeks (till 1/11). US LEs r/o DVT for recent fever, LE edema. Apply ice. - DUPLEX DONE - NEG FOR DVT  - Comprehensive Multidisciplinary Rehab Program     3 hours a day, 5 days a week with PT/OT/SLP     P&O as needed    #HTN  -c/w home dose losartan.  - Encouarge PO. BPs better     #HLD  c/w equivalent home dose statin (at home on on Crestor 10mg)    Pain Management:  - Tylenol PRN  -oxycodone PRN  -Ice PRN    GI/Bowel:  - Senna QHS, Miralax daily   - GI ppx: pepcid    /Bladder:   - Encourage timed voids every 4 hours while awake       Skin/Pressure Injury:  - Skin assessment on admission: intact  - Monitor Incisions: Bilateral knee surgical incision with aquacel to be removed 7 days post op (12/7)-done   ? Staples prior to Dc- will contact ortho  Diet:   - Diet Consistency/Modifications: Reg w/ thins - DASH    DVT ppx:  - ASA BID      Restrictions/Precautions:  - Weight bearing status: WBAT  - Precautions: Falls    ---------------  Outpatient Follow-up:    Dr. Jose Angel Marrero  Internal Medicine  [address]  [address]  [place] [address]  [phone]    Dr. Dick Morelos  Orthopedic Surgery  [address]  [address]  [place] [address]  [phone]  Follow-up in 3 weeks    --------------

## 2022-12-09 NOTE — PROGRESS NOTE ADULT - SUBJECTIVE AND OBJECTIVE BOX
Patient is a 72y old  Male who presents with a chief complaint of OA s/p B/L TKA (08 Dec 2022 10:51)      INTERVAL History of Present Illness/OVERNIGHT EVENTS: participating in PT/OT     MEDICATIONS  (STANDING):  aspirin  chewable 81 milliGRAM(s) Oral every 12 hours  atorvastatin 40 milliGRAM(s) Oral at bedtime  famotidine    Tablet 20 milliGRAM(s) Oral two times a day  ferrous    sulfate 325 milliGRAM(s) Oral daily  lidocaine   4% Patch 2 Patch Transdermal <User Schedule>  losartan 25 milliGRAM(s) Oral daily  multivitamin 1 Tablet(s) Oral daily  senna 2 Tablet(s) Oral at bedtime    MEDICATIONS  (PRN):  acetaminophen     Tablet .. 975 milliGRAM(s) Oral every 8 hours PRN Temp greater or equal to 38C (100.4F), Mild Pain (1 - 3)  oxyCODONE    IR 10 milliGRAM(s) Oral every 6 hours PRN Severe Pain (7 - 10)  oxyCODONE    IR 5 milliGRAM(s) Oral every 6 hours PRN Moderate Pain (4 - 6)  polyethylene glycol 3350 17 Gram(s) Oral daily PRN Constipation      Allergies    No Known Allergies    Intolerances        REVIEW OF SYSTEMS:  Other systems currently negative unless otherwise specified above.    Vital Signs Last 24 Hrs  T(C): 36.9 (09 Dec 2022 07:38), Max: 36.9 (09 Dec 2022 07:38)  T(F): 98.4 (09 Dec 2022 07:38), Max: 98.4 (09 Dec 2022 07:38)  HR: 91 (09 Dec 2022 07:38) (80 - 91)  BP: 116/69 (09 Dec 2022 07:38) (116/69 - 134/69)  BP(mean): --  RR: 16 (09 Dec 2022 07:38) (16 - 16)  SpO2: 96% (09 Dec 2022 07:38) (96% - 98%)    Parameters below as of 09 Dec 2022 07:38  Patient On (Oxygen Delivery Method): room air            PHYSICAL EXAM:  GENERAL: No apparent distress, appears stated age  EYES: Conjunctiva and sclera clear, no discharge  ENMT: Moist mucous membranes, no nasal discharge  CHEST/LUNG: Clear to auscultation bilaterally, no wheeze or rales  HEART: Regular rhythm, no rubs or gallops  ABDOMEN: Soft, Nontender, Nondistended  EXTREMITIES:  No clubbing, cyanosis or edema  SKIN: b/l TKR staples  mild post-op thigh bruising      LABS:      Ca    8.7        08 Dec 2022 06:20          CAPILLARY BLOOD GLUCOSE            RADIOLOGY & ADDITIONAL TESTS:      Images reviewed personally    Consultant Notes Reviewed and Care Discussed with relevant Consultants.

## 2022-12-09 NOTE — PROGRESS NOTE ADULT - ASSESSMENT
71 yo man with history of HTN, HLD, Rotator Cuff Tear (s/p Bilateral Repairs: Left 05/29/2013; Right: 08/28/2019), DDD (s/p Lumbar Laminectomy 1986) and Osteoarthritis admitted to  after hospital course at [facility] for bilateral total knee replacement.     Physical Debility due to B/L TKR  - Comprehensive rehab as per primary team  - Bowel/Pain regimen as per primary team    Hyperlipidemia   - Continue atorvastatin    Normocytic Anemia likely MARTIN and post op anemia  - Continue iron supplements BID    Hypertension  Blood pressure meds with hold parameters     DVT Prophylaxis with aspirin twice a day per ortho protocol.    PCP f/up and outpt CBC in 1-2 weeks

## 2022-12-09 NOTE — PROGRESS NOTE ADULT - SUBJECTIVE AND OBJECTIVE BOX
Cc: bilateral total knee arthroplasty on 11/30/22 with gait difficulties    HPI: NAD overnight, afebrile, denies any new complaints.   Used Lidoderm to knees at night- less pain  BPs Ok Syst 110-130s  Ice during the day secondary to swelling.   Pain controlled, no chest pain, no N/V.   No dizziness , no headaches  Has been tolerating rehabilitation program.        MEDICATIONS  (STANDING):  aspirin  chewable 81 milliGRAM(s) Oral every 12 hours  atorvastatin 40 milliGRAM(s) Oral at bedtime  famotidine    Tablet 20 milliGRAM(s) Oral two times a day  ferrous    sulfate 325 milliGRAM(s) Oral daily  lidocaine   4% Patch 2 Patch Transdermal <User Schedule>  losartan 25 milliGRAM(s) Oral daily  multivitamin 1 Tablet(s) Oral daily  senna 2 Tablet(s) Oral at bedtime    MEDICATIONS  (PRN):  acetaminophen     Tablet .. 975 milliGRAM(s) Oral every 8 hours PRN Temp greater or equal to 38C (100.4F), Mild Pain (1 - 3)  oxyCODONE    IR 10 milliGRAM(s) Oral every 6 hours PRN Severe Pain (7 - 10)  oxyCODONE    IR 5 milliGRAM(s) Oral every 6 hours PRN Moderate Pain (4 - 6)  polyethylene glycol 3350 17 Gram(s) Oral daily PRN Constipation                            8.2    9.28  )-----------( 355      ( 08 Dec 2022 06:20 )             24.8     12-08    136  |  102  |  13  ----------------------------<  116<H>  4.1   |  29  |  0.99    Ca    8.7      08 Dec 2022 06:20    TPro  6.2  /  Alb  2.6<L>  /  TBili  1.1  /  DBili  x   /  AST  34  /  ALT  44  /  AlkPhos  57  12-08        CAPILLARY BLOOD GLUCOSE          Vital Signs Last 24 Hrs  T(C): 36.9 (09 Dec 2022 07:38), Max: 36.9 (09 Dec 2022 07:38)  T(F): 98.4 (09 Dec 2022 07:38), Max: 98.4 (09 Dec 2022 07:38)  HR: 91 (09 Dec 2022 07:38) (80 - 91)  BP: 116/69 (09 Dec 2022 07:38) (116/69 - 134/69)  BP(mean): --  RR: 16 (09 Dec 2022 07:38) (16 - 16)  SpO2: 96% (09 Dec 2022 07:38) (96% - 98%)    Parameters below as of 09 Dec 2022 07:38  Patient On (Oxygen Delivery Method): room air          Physical Exam:   Constitutional - NAD, Comfortable  HEENT - NCAT, EOMI  Neck - Supple, No limited ROM  Chest - good chest expansion, good respiratory effort, CTAB   Cardio - warm and well perfused, RRR, no murmur  Abdomen -  Soft, NTND  Extremities - + swelling bilateral knees, No calf tenderness   Neurologic Exam:                    Cognitive -             Orientation: Awake, Alert, AAO to self, place, date, year, situation            Attention at baseline,   Speech - Fluent       Motor -                      LEFT    UE - ShAB 5/5, EF 5/5, EE 5/5, WE 5/5,  WNL                    RIGHT UE - ShAB 5/5, EF 5/5, EE 5/5, WE 5/5,  WNL                    LEFT    LE - HF 5/5, KE not tested due to pain, DF 5/5, PF 5/5 > 10>100 ROM both knees                    RIGHT LE - HF 5/5, KE not tested due to pain, DF 5/5, PF 5/5        Psychiatric - Mood stable, Affect WNL  Skin on admission: BL surgical incision of the bilateral knees - dressing DCd- staples intact - incision dry      CURRENT MEDICATIONS  MEDICATIONS  (STANDING):  aspirin  chewable 81 milliGRAM(s) Oral every 12 hours  atorvastatin 40 milliGRAM(s) Oral at bedtime  famotidine    Tablet 20 milliGRAM(s) Oral two times a day  ferrous    sulfate 325 milliGRAM(s) Oral daily  losartan 25 milliGRAM(s) Oral daily  multivitamin 1 Tablet(s) Oral daily  senna 2 Tablet(s) Oral at bedtime    MEDICATIONS  (PRN):  acetaminophen     Tablet .. 975 milliGRAM(s) Oral every 8 hours PRN Temp greater or equal to 38C (100.4F), Mild Pain (1 - 3)  oxyCODONE    IR 10 milliGRAM(s) Oral every 4 hours PRN Severe Pain (7 - 10)  oxyCODONE    IR 5 milliGRAM(s) Oral every 4 hours PRN Moderate Pain (4 - 6)  polyethylene glycol 3350 17 Gram(s) Oral daily PRN Constipation    IDT meeting on 12/8    SW: APT 16STE    OT:  eating CS  grooming CS  UBD/LBD CS  toileting CS  tub/shower Cs  bathing    PT:  amb 80ft CG RW  transfers CG  stairs 8st min A    SLP na    tentative dc home c HC 12/15     Continue comprehensive acute rehab program consisting of 3hrs/day of OT/PT.

## 2022-12-10 DIAGNOSIS — E78.5 HYPERLIPIDEMIA, UNSPECIFIED: ICD-10-CM

## 2022-12-10 DIAGNOSIS — I10 ESSENTIAL (PRIMARY) HYPERTENSION: ICD-10-CM

## 2022-12-10 DIAGNOSIS — M17.0 BILATERAL PRIMARY OSTEOARTHRITIS OF KNEE: ICD-10-CM

## 2022-12-10 PROCEDURE — 99232 SBSQ HOSP IP/OBS MODERATE 35: CPT

## 2022-12-10 RX ADMIN — LIDOCAINE 2 PATCH: 4 CREAM TOPICAL at 06:24

## 2022-12-10 RX ADMIN — LIDOCAINE 2 PATCH: 4 CREAM TOPICAL at 21:47

## 2022-12-10 RX ADMIN — Medication 81 MILLIGRAM(S): at 06:19

## 2022-12-10 RX ADMIN — POLYETHYLENE GLYCOL 3350 17 GRAM(S): 17 POWDER, FOR SOLUTION ORAL at 06:19

## 2022-12-10 RX ADMIN — Medication 975 MILLIGRAM(S): at 13:15

## 2022-12-10 RX ADMIN — Medication 325 MILLIGRAM(S): at 12:22

## 2022-12-10 RX ADMIN — FAMOTIDINE 20 MILLIGRAM(S): 10 INJECTION INTRAVENOUS at 06:19

## 2022-12-10 RX ADMIN — Medication 81 MILLIGRAM(S): at 17:51

## 2022-12-10 RX ADMIN — Medication 975 MILLIGRAM(S): at 06:19

## 2022-12-10 RX ADMIN — ATORVASTATIN CALCIUM 40 MILLIGRAM(S): 80 TABLET, FILM COATED ORAL at 21:48

## 2022-12-10 RX ADMIN — Medication 1 TABLET(S): at 12:23

## 2022-12-10 RX ADMIN — Medication 975 MILLIGRAM(S): at 14:00

## 2022-12-10 RX ADMIN — FAMOTIDINE 20 MILLIGRAM(S): 10 INJECTION INTRAVENOUS at 17:51

## 2022-12-10 RX ADMIN — SENNA PLUS 2 TABLET(S): 8.6 TABLET ORAL at 21:47

## 2022-12-10 NOTE — PROGRESS NOTE ADULT - NSPROGADDITIONALINFOA_GEN_ALL_CORE
I have personally interviewed and examined this patient, reviewed pertinent clinical information, and performed the evaluation and management services provided at today's visit for inpatient medical follow up    I am available to discuss any issues related to the medical care of this patient on the unit, or by phone at [phone]

## 2022-12-10 NOTE — PROGRESS NOTE ADULT - SUBJECTIVE AND OBJECTIVE BOX
Cc: Gait dysfunction    HPI: Patient seen and examined at bedside. No acute events overnight. Feels well.   Pain controlled, no chest pain, no N/V, no Fevers/Chills. No other new ROS  Has been tolerating rehabilitation program.    acetaminophen     Tablet .. 975 milliGRAM(s) Oral every 8 hours PRN  aspirin  chewable 81 milliGRAM(s) Oral every 12 hours  atorvastatin 40 milliGRAM(s) Oral at bedtime  famotidine    Tablet 20 milliGRAM(s) Oral two times a day  ferrous    sulfate 325 milliGRAM(s) Oral daily  lidocaine   4% Patch 2 Patch Transdermal <User Schedule>  losartan 25 milliGRAM(s) Oral daily  multivitamin 1 Tablet(s) Oral daily  oxyCODONE    IR 10 milliGRAM(s) Oral every 6 hours PRN  oxyCODONE    IR 5 milliGRAM(s) Oral every 6 hours PRN  polyethylene glycol 3350 17 Gram(s) Oral daily PRN  senna 2 Tablet(s) Oral at bedtime      T(C): 37.3 (12-09-22 @ 19:55), Max: 37.3 (12-09-22 @ 19:55)  HR: 70 (12-10-22 @ 09:06) (70 - 85)  BP: 108/62 (12-10-22 @ 09:06) (108/58 - 117/70)  RR: 14 (12-10-22 @ 09:06) (14 - 17)  SpO2: 98% (12-10-22 @ 09:06) (98% - 98%)    In NAD  HEENT- EOMI  Heart- S1S2  Lungs- CTA bl.  Abd- + BS, NT  Ext- No calf pain, knee incisions with staples c/d/i  Neuro- Exam unchanged          Imp: Patient with diagnosis of bilateral TKR admitted for comprehensive acute rehabilitation.    Plan:  - Continue PT/OT/SLP as indicated  - DVT prophylaxis  - Skin- Turn q2h, check skin daily  - Continue current medications  -Active issues-   HTN - continue BP meds  HLD - continue statin  - Patient is stable to continue current rehabilitation program.

## 2022-12-10 NOTE — PROGRESS NOTE ADULT - ASSESSMENT
71 yo man with history of HTN, HLD, Rotator Cuff Tear (s/p Bilateral Repairs: Left 05/29/2013; Right: 08/28/2019), DDD (s/p Lumbar Laminectomy 1986) and Osteoarthritis admitted to  after hospital course at [facility] for bilateral total knee replacement.     Physical Debility due to B/L TKR  -continue comprehensive rehab as per primary team  - Bowel/Pain regimen as per primary team    Hyperlipidemia   - Continue atorvastatin    Normocytic Anemia likely MARTIN and post op anemia  - Continue iron supplements BID    Hypertension  Blood pressure meds with hold parameters     DVT Prophylaxis  - aspirin twice a day per ortho protocol.

## 2022-12-10 NOTE — PROGRESS NOTE ADULT - SUBJECTIVE AND OBJECTIVE BOX
Patient is a 72y old  Male who presents with a chief complaint of OA s/p B/L TKA (09 Dec 2022 16:29)      History, interim events and clinically pertinent issues reviewed; patient interviewed and examined. His knee pain is controlled. He slept well, is moving his bowels and has no other complaints    ALLERGIES:  No Known Allergies    MEDICATIONS  (STANDING):  aspirin  chewable 81 milliGRAM(s) Oral every 12 hours  atorvastatin 40 milliGRAM(s) Oral at bedtime  famotidine    Tablet 20 milliGRAM(s) Oral two times a day  ferrous    sulfate 325 milliGRAM(s) Oral daily  lidocaine   4% Patch 2 Patch Transdermal <User Schedule>  losartan 25 milliGRAM(s) Oral daily  multivitamin 1 Tablet(s) Oral daily  senna 2 Tablet(s) Oral at bedtime    MEDICATIONS  (PRN):  acetaminophen     Tablet .. 975 milliGRAM(s) Oral every 8 hours PRN Temp greater or equal to 38C (100.4F), Mild Pain (1 - 3)  oxyCODONE    IR 10 milliGRAM(s) Oral every 6 hours PRN Severe Pain (7 - 10)  oxyCODONE    IR 5 milliGRAM(s) Oral every 6 hours PRN Moderate Pain (4 - 6)  polyethylene glycol 3350 17 Gram(s) Oral daily PRN Constipation    Vital Signs Last 24 Hrs  T(F): 99.2 (09 Dec 2022 19:55), Max: 99.2 (09 Dec 2022 19:55)  HR: 85 (10 Dec 2022 06:15) (77 - 85)  BP: 117/70 (10 Dec 2022 06:15) (108/58 - 117/70)  RR: 17 (09 Dec 2022 19:55) (17 - 17)  SpO2: 98% (09 Dec 2022 19:55) (98% - 98%)  I&O's Summary              PHYSICAL EXAM:  General: NAD, A/O x 3  ENT: MMM  Neck: Supple, No JVD  Lungs: Clear to auscultation bilaterally  Cardio: RRR, S1/S2, No murmurs  Abdomen: Soft, Nontender, Nondistended; Bowel sounds present  Extremities: No calf tenderness, +localized edema/ bilateral knee incision w/ staples c/d/i      LABS:                        8.2    9.28  )-----------( 355      ( 08 Dec 2022 06:20 )             24.8       12-08    136  |  102  |  13  ----------------------------<  116  4.1   |  29  |  0.99    Ca    8.7      08 Dec 2022 06:20    TPro  6.2  /  Alb  2.6  /  TBili  1.1  /  DBili  x   /  AST  34  /  ALT  44  /  AlkPhos  57  12-08                                Culture - Blood (collected 04 Dec 2022 00:34)  Source: .Blood Blood  Final Report (09 Dec 2022 07:01):    No Growth Final      COVID-19 PCR: NotDetec (12-04-22 @ 06:30)  COVID-19 PCR: NotDetec (12-03-22 @ 06:00)

## 2022-12-11 PROCEDURE — 99232 SBSQ HOSP IP/OBS MODERATE 35: CPT

## 2022-12-11 RX ADMIN — OXYCODONE HYDROCHLORIDE 10 MILLIGRAM(S): 5 TABLET ORAL at 21:35

## 2022-12-11 RX ADMIN — FAMOTIDINE 20 MILLIGRAM(S): 10 INJECTION INTRAVENOUS at 06:37

## 2022-12-11 RX ADMIN — OXYCODONE HYDROCHLORIDE 5 MILLIGRAM(S): 5 TABLET ORAL at 07:28

## 2022-12-11 RX ADMIN — OXYCODONE HYDROCHLORIDE 5 MILLIGRAM(S): 5 TABLET ORAL at 06:38

## 2022-12-11 RX ADMIN — LIDOCAINE 2 PATCH: 4 CREAM TOPICAL at 19:00

## 2022-12-11 RX ADMIN — LOSARTAN POTASSIUM 25 MILLIGRAM(S): 100 TABLET, FILM COATED ORAL at 07:38

## 2022-12-11 RX ADMIN — Medication 81 MILLIGRAM(S): at 18:46

## 2022-12-11 RX ADMIN — FAMOTIDINE 20 MILLIGRAM(S): 10 INJECTION INTRAVENOUS at 18:47

## 2022-12-11 RX ADMIN — Medication 975 MILLIGRAM(S): at 12:03

## 2022-12-11 RX ADMIN — ATORVASTATIN CALCIUM 40 MILLIGRAM(S): 80 TABLET, FILM COATED ORAL at 21:11

## 2022-12-11 RX ADMIN — LIDOCAINE 2 PATCH: 4 CREAM TOPICAL at 18:47

## 2022-12-11 RX ADMIN — SENNA PLUS 2 TABLET(S): 8.6 TABLET ORAL at 21:11

## 2022-12-11 RX ADMIN — Medication 1 TABLET(S): at 12:03

## 2022-12-11 RX ADMIN — OXYCODONE HYDROCHLORIDE 10 MILLIGRAM(S): 5 TABLET ORAL at 21:11

## 2022-12-11 RX ADMIN — Medication 81 MILLIGRAM(S): at 06:37

## 2022-12-11 RX ADMIN — Medication 325 MILLIGRAM(S): at 12:03

## 2022-12-11 NOTE — PROGRESS NOTE ADULT - ASSESSMENT
72M with PMH HTN, HLD, osteoarthritis admitted to [facility] after hospital course at [facility] for bilateral total knee replacement.     #Osteoarthritis s/p bilateral TKR  -Continue comprehensive rehab program   -Continue pain control with Lidocaine patch, Oxycodone PRN  - Bowel/Pain regimen as per primary team    #HLD  -Continue Lipitor     #Anemia  Likely anemia of chronic disease with component of post op anemia  -H/H stable, no overt signs of bleeding  -Continue iron tabs  -Follow up AM CBC    #HTN  -Continue Losartan  -Monitor vitals    #Prophylactic Measure  -DVT ppx: ASA 81mg BID per ortho  -GI ppx: Pepcid   -Bowel regimen 72M with PMH HTN, HLD, osteoarthritis admitted to [facility] after hospital course at [facility] for bilateral total knee replacement.     #Osteoarthritis s/p bilateral TKR  -Continue comprehensive rehab program   -Continue pain control with Lidocaine patch, Oxycodone PRN    #HLD  -Continue Lipitor     #Anemia  Likely anemia of chronic disease with component of post op anemia  -H/H stable, no overt signs of bleeding  -Continue iron tabs  -Follow up AM CBC    #HTN  -Continue Losartan  -Monitor vitals    #Prophylactic Measure  -DVT ppx: ASA 81mg BID per ortho  -GI ppx: Pepcid   -Bowel regimen

## 2022-12-11 NOTE — PROGRESS NOTE ADULT - SUBJECTIVE AND OBJECTIVE BOX
Patient is a 72y old  Male who presents with a chief complaint of OA s/p B/L TKA (11 Dec 2022 08:15)      Patient seen and examined at bedside. No overnight events.  Pain well controlled with current regimen.   Participating in therapy.     REVIEW OF SYSTEMS:  CONSTITUTIONAL: No fever or chills  CARDIOVASCULAR: No chest pain, palpitations  GASTROINTESTINAL: No abd pain, nausea, vomiting, or diarrhea    ALLERGIES:  No Known Allergies    MEDICATIONS  (STANDING):  aspirin  chewable 81 milliGRAM(s) Oral every 12 hours  atorvastatin 40 milliGRAM(s) Oral at bedtime  famotidine    Tablet 20 milliGRAM(s) Oral two times a day  ferrous    sulfate 325 milliGRAM(s) Oral daily  lidocaine   4% Patch 2 Patch Transdermal <User Schedule>  losartan 25 milliGRAM(s) Oral daily  multivitamin 1 Tablet(s) Oral daily  senna 2 Tablet(s) Oral at bedtime    MEDICATIONS  (PRN):  acetaminophen     Tablet .. 975 milliGRAM(s) Oral every 8 hours PRN Temp greater or equal to 38C (100.4F), Mild Pain (1 - 3)  oxyCODONE    IR 10 milliGRAM(s) Oral every 6 hours PRN Severe Pain (7 - 10)  oxyCODONE    IR 5 milliGRAM(s) Oral every 6 hours PRN Moderate Pain (4 - 6)  polyethylene glycol 3350 17 Gram(s) Oral daily PRN Constipation    Vital Signs Last 24 Hrs  T(F): 98.8 (11 Dec 2022 07:41), Max: 99.2 (10 Dec 2022 19:52)  HR: 73 (11 Dec 2022 07:41) (73 - 81)  BP: 123/66 (11 Dec 2022 07:41) (108/62 - 123/66)  RR: 14 (11 Dec 2022 07:41) (14 - 15)  SpO2: 99% (11 Dec 2022 07:41) (99% - 99%)  I&O's Summary        PHYSICAL EXAM:  GENERAL: NAD  HEENT:  AT/NC, anicteric, moist mucous membranes, EOMI, PERRL, no lid-lag, conjunctiva and sclera clear  CHEST/LUNG:  CTA b/l, no rales, wheezes, or rhonchi,  normal respiratory effort, no intercostal retractions  HEART:  RRR, S1, S2, no murmurs; no pitting edema  ABDOMEN:  BS+, soft, nontender, nondistended  MSK/EXTREMITIES: palpable peripheral pulses, no clubbing or cyanosis; bilateral knee staples c/d/i without surround erythema incision healed  NERVOUS SYSTEM: answers questions and follows commands appropriately A&Ox3 grossly moves all extremities   PSYCH: Appropriate affect, Alert & Awake; Good judgement    LABS: Personally reviewed          COVID-19 PCR: NotDetec (12-04-22 @ 06:30)  COVID-19 PCR: NotDetec (12-03-22 @ 06:00)      RADIOLOGY & ADDITIONAL TESTS: Personally reviewed

## 2022-12-11 NOTE — PROGRESS NOTE ADULT - SUBJECTIVE AND OBJECTIVE BOX
Cc: Gait dysfunction    HPI: Patient seen and examined at bedside. No acute events overnight.   Pain controlled, no chest pain, no N/V, no Fevers/Chills. No other new ROS  Has been tolerating rehabilitation program.    acetaminophen     Tablet .. 975 milliGRAM(s) Oral every 8 hours PRN  aspirin  chewable 81 milliGRAM(s) Oral every 12 hours  atorvastatin 40 milliGRAM(s) Oral at bedtime  famotidine    Tablet 20 milliGRAM(s) Oral two times a day  ferrous    sulfate 325 milliGRAM(s) Oral daily  lidocaine   4% Patch 2 Patch Transdermal <User Schedule>  losartan 25 milliGRAM(s) Oral daily  multivitamin 1 Tablet(s) Oral daily  oxyCODONE    IR 10 milliGRAM(s) Oral every 6 hours PRN  oxyCODONE    IR 5 milliGRAM(s) Oral every 6 hours PRN  polyethylene glycol 3350 17 Gram(s) Oral daily PRN  senna 2 Tablet(s) Oral at bedtime      T(C): 37.1 (12-11-22 @ 07:41), Max: 37.3 (12-10-22 @ 19:52)  HR: 73 (12-11-22 @ 07:41) (73 - 81)  BP: 123/66 (12-11-22 @ 07:41) (108/62 - 123/66)  RR: 14 (12-11-22 @ 07:41) (14 - 15)  SpO2: 99% (12-11-22 @ 07:41) (99% - 99%)    In NAD, sitting comfortably in bed  HEENT- EOMI  Heart- S1S2  Lungs- CTA bl.  Abd- + BS, NT  Ext- No calf pain, knee incisions with staples c/d/i  Neuro- Exam unchanged      Imp: Patient with diagnosis of bilateral TKR admitted for comprehensive acute rehabilitation.    Plan:  - Continue PT/OT/SLP as indicated  - DVT prophylaxis  - Skin- Turn q2h, check skin daily  - Continue current medications  -Active issues-   HTN - continue BP meds  HLD - continue statin  Pain - continue lidocaine patches around knees, Tylenol/Oxy PRN  - Patient is stable to continue current rehabilitation program.

## 2022-12-12 LAB
ALBUMIN SERPL ELPH-MCNC: 2.9 G/DL — LOW (ref 3.3–5)
ALP SERPL-CCNC: 67 U/L — SIGNIFICANT CHANGE UP (ref 40–120)
ALT FLD-CCNC: 49 U/L — HIGH (ref 10–45)
ANION GAP SERPL CALC-SCNC: 5 MMOL/L — SIGNIFICANT CHANGE UP (ref 5–17)
AST SERPL-CCNC: 26 U/L — SIGNIFICANT CHANGE UP (ref 10–40)
BASOPHILS # BLD AUTO: 0.03 K/UL — SIGNIFICANT CHANGE UP (ref 0–0.2)
BASOPHILS NFR BLD AUTO: 0.3 % — SIGNIFICANT CHANGE UP (ref 0–2)
BILIRUB SERPL-MCNC: 0.7 MG/DL — SIGNIFICANT CHANGE UP (ref 0.2–1.2)
BUN SERPL-MCNC: 16 MG/DL — SIGNIFICANT CHANGE UP (ref 7–23)
CALCIUM SERPL-MCNC: 9 MG/DL — SIGNIFICANT CHANGE UP (ref 8.4–10.5)
CHLORIDE SERPL-SCNC: 104 MMOL/L — SIGNIFICANT CHANGE UP (ref 96–108)
CO2 SERPL-SCNC: 30 MMOL/L — SIGNIFICANT CHANGE UP (ref 22–31)
CREAT SERPL-MCNC: 1.03 MG/DL — SIGNIFICANT CHANGE UP (ref 0.5–1.3)
EGFR: 77 ML/MIN/1.73M2 — SIGNIFICANT CHANGE UP
EOSINOPHIL # BLD AUTO: 0.07 K/UL — SIGNIFICANT CHANGE UP (ref 0–0.5)
EOSINOPHIL NFR BLD AUTO: 0.7 % — SIGNIFICANT CHANGE UP (ref 0–6)
GLUCOSE SERPL-MCNC: 104 MG/DL — HIGH (ref 70–99)
HCT VFR BLD CALC: 27.9 % — LOW (ref 39–50)
HGB BLD-MCNC: 9.1 G/DL — LOW (ref 13–17)
IMM GRANULOCYTES NFR BLD AUTO: 1 % — HIGH (ref 0–0.9)
LYMPHOCYTES # BLD AUTO: 2.32 K/UL — SIGNIFICANT CHANGE UP (ref 1–3.3)
LYMPHOCYTES # BLD AUTO: 23.9 % — SIGNIFICANT CHANGE UP (ref 13–44)
MCHC RBC-ENTMCNC: 29.6 PG — SIGNIFICANT CHANGE UP (ref 27–34)
MCHC RBC-ENTMCNC: 32.6 GM/DL — SIGNIFICANT CHANGE UP (ref 32–36)
MCV RBC AUTO: 90.9 FL — SIGNIFICANT CHANGE UP (ref 80–100)
MONOCYTES # BLD AUTO: 0.73 K/UL — SIGNIFICANT CHANGE UP (ref 0–0.9)
MONOCYTES NFR BLD AUTO: 7.5 % — SIGNIFICANT CHANGE UP (ref 2–14)
NEUTROPHILS # BLD AUTO: 6.44 K/UL — SIGNIFICANT CHANGE UP (ref 1.8–7.4)
NEUTROPHILS NFR BLD AUTO: 66.6 % — SIGNIFICANT CHANGE UP (ref 43–77)
NRBC # BLD: 0 /100 WBCS — SIGNIFICANT CHANGE UP (ref 0–0)
PLATELET # BLD AUTO: 527 K/UL — HIGH (ref 150–400)
POTASSIUM SERPL-MCNC: 4.5 MMOL/L — SIGNIFICANT CHANGE UP (ref 3.5–5.3)
POTASSIUM SERPL-SCNC: 4.5 MMOL/L — SIGNIFICANT CHANGE UP (ref 3.5–5.3)
PROT SERPL-MCNC: 6.7 G/DL — SIGNIFICANT CHANGE UP (ref 6–8.3)
RBC # BLD: 3.07 M/UL — LOW (ref 4.2–5.8)
RBC # FLD: 13.7 % — SIGNIFICANT CHANGE UP (ref 10.3–14.5)
SODIUM SERPL-SCNC: 139 MMOL/L — SIGNIFICANT CHANGE UP (ref 135–145)
WBC # BLD: 9.69 K/UL — SIGNIFICANT CHANGE UP (ref 3.8–10.5)
WBC # FLD AUTO: 9.69 K/UL — SIGNIFICANT CHANGE UP (ref 3.8–10.5)

## 2022-12-12 PROCEDURE — 99232 SBSQ HOSP IP/OBS MODERATE 35: CPT

## 2022-12-12 PROCEDURE — 99232 SBSQ HOSP IP/OBS MODERATE 35: CPT | Mod: GC

## 2022-12-12 RX ADMIN — Medication 975 MILLIGRAM(S): at 13:59

## 2022-12-12 RX ADMIN — Medication 975 MILLIGRAM(S): at 12:17

## 2022-12-12 RX ADMIN — LIDOCAINE 2 PATCH: 4 CREAM TOPICAL at 17:06

## 2022-12-12 RX ADMIN — Medication 81 MILLIGRAM(S): at 17:06

## 2022-12-12 RX ADMIN — Medication 81 MILLIGRAM(S): at 06:42

## 2022-12-12 RX ADMIN — LIDOCAINE 2 PATCH: 4 CREAM TOPICAL at 06:43

## 2022-12-12 RX ADMIN — OXYCODONE HYDROCHLORIDE 5 MILLIGRAM(S): 5 TABLET ORAL at 21:34

## 2022-12-12 RX ADMIN — FAMOTIDINE 20 MILLIGRAM(S): 10 INJECTION INTRAVENOUS at 06:42

## 2022-12-12 RX ADMIN — Medication 1 TABLET(S): at 12:17

## 2022-12-12 RX ADMIN — OXYCODONE HYDROCHLORIDE 5 MILLIGRAM(S): 5 TABLET ORAL at 14:11

## 2022-12-12 RX ADMIN — LOSARTAN POTASSIUM 25 MILLIGRAM(S): 100 TABLET, FILM COATED ORAL at 06:42

## 2022-12-12 RX ADMIN — Medication 325 MILLIGRAM(S): at 12:16

## 2022-12-12 RX ADMIN — SENNA PLUS 2 TABLET(S): 8.6 TABLET ORAL at 21:34

## 2022-12-12 RX ADMIN — OXYCODONE HYDROCHLORIDE 5 MILLIGRAM(S): 5 TABLET ORAL at 09:00

## 2022-12-12 RX ADMIN — OXYCODONE HYDROCHLORIDE 5 MILLIGRAM(S): 5 TABLET ORAL at 08:17

## 2022-12-12 RX ADMIN — ATORVASTATIN CALCIUM 40 MILLIGRAM(S): 80 TABLET, FILM COATED ORAL at 21:34

## 2022-12-12 RX ADMIN — FAMOTIDINE 20 MILLIGRAM(S): 10 INJECTION INTRAVENOUS at 17:06

## 2022-12-12 NOTE — PROGRESS NOTE ADULT - SUBJECTIVE AND OBJECTIVE BOX
Patient is a 72y old  Male who presents with a chief complaint of OA s/p B/L TKA (11 Dec 2022 15:15)      Patient seen and examined at bedside. No overnight events.    REVIEW OF SYSTEMS:  CONSTITUTIONAL: No fever or chills  GASTROINTESTINAL: No abd pain, nausea, vomiting, or diarrhea    ALLERGIES:  No Known Allergies    MEDICATIONS  (STANDING):  aspirin  chewable 81 milliGRAM(s) Oral every 12 hours  atorvastatin 40 milliGRAM(s) Oral at bedtime  famotidine    Tablet 20 milliGRAM(s) Oral two times a day  ferrous    sulfate 325 milliGRAM(s) Oral daily  lidocaine   4% Patch 2 Patch Transdermal <User Schedule>  losartan 25 milliGRAM(s) Oral daily  multivitamin 1 Tablet(s) Oral daily  senna 2 Tablet(s) Oral at bedtime    MEDICATIONS  (PRN):  acetaminophen     Tablet .. 975 milliGRAM(s) Oral every 8 hours PRN Temp greater or equal to 38C (100.4F), Mild Pain (1 - 3)  oxyCODONE    IR 10 milliGRAM(s) Oral every 6 hours PRN Severe Pain (7 - 10)  oxyCODONE    IR 5 milliGRAM(s) Oral every 6 hours PRN Moderate Pain (4 - 6)  polyethylene glycol 3350 17 Gram(s) Oral daily PRN Constipation    Vital Signs Last 24 Hrs  T(F): 98.6 (12 Dec 2022 06:29), Max: 98.6 (12 Dec 2022 06:29)  HR: 75 (12 Dec 2022 06:29) (75 - 79)  BP: 118/76 (12 Dec 2022 06:29) (116/61 - 118/76)  RR: 14 (12 Dec 2022 06:29) (14 - 15)  SpO2: 97% (12 Dec 2022 06:29) (97% - 98%)  I&O's Summary        PHYSICAL EXAM:  GENERAL: NAD  HEENT:  AT/NC, anicteric, moist mucous membranes, EOMI, PERRL, no lid-lag, conjunctiva and sclera clear  CHEST/LUNG:  CTA b/l, no rales, wheezes, or rhonchi,  normal respiratory effort, no intercostal retractions  HEART:  RRR, S1, S2, no murmurs; no pitting edema  ABDOMEN:  BS+, soft, nontender, nondistended  MSK/EXTREMITIES: palpable peripheral pulses, no clubbing or cyanosis; bilateral knee staples c/d/i without surround erythema incision healed  NERVOUS SYSTEM: answers questions and follows commands appropriately A&Ox3 grossly moves all extremities   PSYCH: Appropriate affect, Alert & Awake; Good judgement      LABS: Personally reviewed                        9.1    9.69  )-----------( 527      ( 12 Dec 2022 06:13 )             27.9       12-12    139  |  104  |  16  ----------------------------<  104  4.5   |  30  |  1.03    Ca    9.0      12 Dec 2022 06:13    TPro  6.7  /  Alb  2.9  /  TBili  0.7  /  DBili  x   /  AST  26  /  ALT  49  /  AlkPhos  67  12-12              COVID-19 PCR: NotDetec (12-04-22 @ 06:30)  COVID-19 PCR: NotDetec (12-03-22 @ 06:00)      RADIOLOGY & ADDITIONAL TESTS: Personally reviewed    Medical management discussed with Dr. Corey (physiatry), continue Losartan for HTN.

## 2022-12-12 NOTE — PROGRESS NOTE ADULT - ASSESSMENT
72M with PMH HTN, HLD, osteoarthritis admitted to [facility] after hospital course at [facility] for bilateral total knee replacement.     #Osteoarthritis s/p bilateral TKR  -Continue comprehensive rehab program   -Continue pain control with Lidocaine patch, Oxycodone PRN    #HLD  -Continue Lipitor     #Anemia  Likely anemia of chronic disease with component of post op anemia  -H/H stable, no overt signs of bleeding  -Continue iron tabs  -Follow up AM CBC    #HTN  -Continue Losartan  -Monitor vitals    #Prophylactic Measure  -DVT ppx: ASA 81mg BID per ortho  -GI ppx: Pepcid   -Bowel regimen

## 2022-12-12 NOTE — PROGRESS NOTE ADULT - ASSESSMENT
72yM with a history of HTN, HLD, OA, who presented to [facility] on 11/30 with complaint of bilateral knee pain and OA who underwent bilateral total knee arthroplasty on 11/30/22. Hospital course supposedly uncomplicated.        Admitted to [facility] acute inpatient rehab on 12/2 for ADL, gait, and functional impairments.       #Functional deficits due to OA s/p Bilateral Total Knee Arthroplasty  -b/l TKA 11/30/22  -pain control  -DVT ppx with ASA BID for 6 weeks (till 1/11). US LEs r/o DVT for recent fever, LE edema. Apply ice. - DUPLEX DONE - NEG FOR DVT  - Comprehensive Multidisciplinary Rehab Program     3 hours a day, 5 days a week with PT/OT/SLP     P&O as needed    #HTN  -c/w home dose losartan.  - Encouarge PO. BPs better     #HLD  c/w equivalent home dose statin (at home on on Crestor 10mg)    Pain Management:  - Tylenol PRN  -oxycodone PRN  -Ice PRN    GI/Bowel:  - Senna QHS, Miralax daily   - GI ppx: pepcid    /Bladder:   - Encourage timed voids every 4 hours while awake       Skin/Pressure Injury:  - Skin assessment on admission: intact  - Monitor Incisions: Bilateral knee surgical incision with aquacel to be removed 7 days post op (12/7)-done   ? Staples prior to Dc- will contact ortho Dr Morelos- call placed pending callback today or tomorrow.  Diet:   - Diet Consistency/Modifications: Reg w/ thins - DASH    DVT ppx:  - ASA BID      Restrictions/Precautions:  - Weight bearing status: WBAT  - Precautions: Falls    ---------------  Outpatient Follow-up:    Dr. Jose Angel Marrero  Internal Medicine  [address]  [address]  [place] [address]  [phone]    Dr. Dick Morelos  Orthopedic Surgery  [address]  [address]  [place] [address]  [phone]  Follow-up in 3 weeks    --------------

## 2022-12-12 NOTE — PROGRESS NOTE ADULT - SUBJECTIVE AND OBJECTIVE BOX
Patient is a 72y old  Male who presents with a chief complaint of OA s/p B/L TKA (12 Dec 2022 08:23)        SUBJECTIVE/ROS: Patient seen and examined. No acute overnight events, slept well.    MEDICATIONS:  MEDICATIONS  (STANDING):  aspirin  chewable 81 milliGRAM(s) Oral every 12 hours  atorvastatin 40 milliGRAM(s) Oral at bedtime  famotidine    Tablet 20 milliGRAM(s) Oral two times a day  ferrous    sulfate 325 milliGRAM(s) Oral daily  lidocaine   4% Patch 2 Patch Transdermal <User Schedule>  losartan 25 milliGRAM(s) Oral daily  multivitamin 1 Tablet(s) Oral daily  senna 2 Tablet(s) Oral at bedtime    MEDICATIONS  (PRN):  acetaminophen     Tablet .. 975 milliGRAM(s) Oral every 8 hours PRN Temp greater or equal to 38C (100.4F), Mild Pain (1 - 3)  oxyCODONE    IR 10 milliGRAM(s) Oral every 6 hours PRN Severe Pain (7 - 10)  oxyCODONE    IR 5 milliGRAM(s) Oral every 6 hours PRN Moderate Pain (4 - 6)  polyethylene glycol 3350 17 Gram(s) Oral daily PRN Constipation      RECENT LABS:                        9.1    9.69  )-----------( 527      ( 12 Dec 2022 06:13 )             27.9     12-12    139  |  104  |  16  ----------------------------<  104<H>  4.5   |  30  |  1.03    Ca    9.0      12 Dec 2022 06:13    TPro  6.7  /  Alb  2.9<L>  /  TBili  0.7  /  DBili  x   /  AST  26  /  ALT  49<H>  /  AlkPhos  67  12-12    LIVER FUNCTIONS - ( 12 Dec 2022 06:13 )  Alb: 2.9 g/dL / Pro: 6.7 g/dL / ALK PHOS: 67 U/L / ALT: 49 U/L / AST: 26 U/L / GGT: x                 CAPILLARY BLOOD GLUCOSE          VITALS  72y  Vital Signs Last 24 Hrs  T(C): 36.8 (12 Dec 2022 08:30), Max: 37 (12 Dec 2022 06:29)  T(F): 98.2 (12 Dec 2022 08:30), Max: 98.6 (12 Dec 2022 06:29)  HR: 72 (12 Dec 2022 08:30) (72 - 79)  BP: 116/61 (12 Dec 2022 08:30) (116/61 - 118/76)  BP(mean): --  RR: 14 (12 Dec 2022 08:30) (14 - 15)  SpO2: 98% (12 Dec 2022 08:30) (97% - 98%)    Parameters below as of 12 Dec 2022 08:30  Patient On (Oxygen Delivery Method): room air      Daily     Daily     PHYSICAL EXAM:    Patient is a 72y old  Male who presents with a chief complaint of OA s/p B/L TKA (12 Dec 2022 08:23)      SUBJECTIVE/ROS: Patient seen and examined during therapy. He is doing well. Denies pain/diarrhea/constipation/dysuria.     MEDICATIONS:  MEDICATIONS  (STANDING):  aspirin  chewable 81 milliGRAM(s) Oral every 12 hours  atorvastatin 40 milliGRAM(s) Oral at bedtime  famotidine    Tablet 20 milliGRAM(s) Oral two times a day  ferrous    sulfate 325 milliGRAM(s) Oral daily  lidocaine   4% Patch 2 Patch Transdermal <User Schedule>  losartan 25 milliGRAM(s) Oral daily  multivitamin 1 Tablet(s) Oral daily  senna 2 Tablet(s) Oral at bedtime    MEDICATIONS  (PRN):  acetaminophen     Tablet .. 975 milliGRAM(s) Oral every 8 hours PRN Temp greater or equal to 38C (100.4F), Mild Pain (1 - 3)  oxyCODONE    IR 10 milliGRAM(s) Oral every 6 hours PRN Severe Pain (7 - 10)  oxyCODONE    IR 5 milliGRAM(s) Oral every 6 hours PRN Moderate Pain (4 - 6)  polyethylene glycol 3350 17 Gram(s) Oral daily PRN Constipation      RECENT LABS:                        9.1    9.69  )-----------( 527      ( 12 Dec 2022 06:13 )             27.9     12-12    139  |  104  |  16  ----------------------------<  104<H>  4.5   |  30  |  1.03    Ca    9.0      12 Dec 2022 06:13    TPro  6.7  /  Alb  2.9<L>  /  TBili  0.7  /  DBili  x   /  AST  26  /  ALT  49<H>  /  AlkPhos  67  12-12    LIVER FUNCTIONS - ( 12 Dec 2022 06:13 )  Alb: 2.9 g/dL / Pro: 6.7 g/dL / ALK PHOS: 67 U/L / ALT: 49 U/L / AST: 26 U/L / GGT: x                 CAPILLARY BLOOD GLUCOSE          VITALS  72y  Vital Signs Last 24 Hrs  T(C): 36.8 (12 Dec 2022 08:30), Max: 37 (12 Dec 2022 06:29)  T(F): 98.2 (12 Dec 2022 08:30), Max: 98.6 (12 Dec 2022 06:29)  HR: 72 (12 Dec 2022 08:30) (72 - 79)  BP: 116/61 (12 Dec 2022 08:30) (116/61 - 118/76)  BP(mean): --  RR: 14 (12 Dec 2022 08:30) (14 - 15)  SpO2: 98% (12 Dec 2022 08:30) (97% - 98%)    Parameters below as of 12 Dec 2022 08:30  Patient On (Oxygen Delivery Method): room air      Daily     Daily     PHYSICAL EXAM:    Patient is a 72y old  Male who presents with a chief complaint of OA s/p B/L TKA (12 Dec 2022 08:23)      SUBJECTIVE/ROS: Patient seen and examined during therapy. He is doing well. Denies pain/diarrhea/constipation/dysuria. BPs controlled.    MEDICATIONS:  MEDICATIONS  (STANDING):  aspirin  chewable 81 milliGRAM(s) Oral every 12 hours  atorvastatin 40 milliGRAM(s) Oral at bedtime  famotidine    Tablet 20 milliGRAM(s) Oral two times a day  ferrous    sulfate 325 milliGRAM(s) Oral daily  lidocaine   4% Patch 2 Patch Transdermal <User Schedule>  losartan 25 milliGRAM(s) Oral daily  multivitamin 1 Tablet(s) Oral daily  senna 2 Tablet(s) Oral at bedtime    MEDICATIONS  (PRN):  acetaminophen     Tablet .. 975 milliGRAM(s) Oral every 8 hours PRN Temp greater or equal to 38C (100.4F), Mild Pain (1 - 3)  oxyCODONE    IR 10 milliGRAM(s) Oral every 6 hours PRN Severe Pain (7 - 10)  oxyCODONE    IR 5 milliGRAM(s) Oral every 6 hours PRN Moderate Pain (4 - 6)  polyethylene glycol 3350 17 Gram(s) Oral daily PRN Constipation      RECENT LABS:                        9.1    9.69  )-----------( 527      ( 12 Dec 2022 06:13 )             27.9     12-12    139  |  104  |  16  ----------------------------<  104<H>  4.5   |  30  |  1.03    Ca    9.0      12 Dec 2022 06:13    TPro  6.7  /  Alb  2.9<L>  /  TBili  0.7  /  DBili  x   /  AST  26  /  ALT  49<H>  /  AlkPhos  67  12-12    LIVER FUNCTIONS - ( 12 Dec 2022 06:13 )  Alb: 2.9 g/dL / Pro: 6.7 g/dL / ALK PHOS: 67 U/L / ALT: 49 U/L / AST: 26 U/L / GGT: x                 CAPILLARY BLOOD GLUCOSE          VITALS  72y  Vital Signs Last 24 Hrs  T(C): 36.8 (12 Dec 2022 08:30), Max: 37 (12 Dec 2022 06:29)  T(F): 98.2 (12 Dec 2022 08:30), Max: 98.6 (12 Dec 2022 06:29)  HR: 72 (12 Dec 2022 08:30) (72 - 79)  BP: 116/61 (12 Dec 2022 08:30) (116/61 - 118/76)  BP(mean): --  RR: 14 (12 Dec 2022 08:30) (14 - 15)  SpO2: 98% (12 Dec 2022 08:30) (97% - 98%)    Parameters below as of 12 Dec 2022 08:30  Patient On (Oxygen Delivery Method): room air      Daily     Daily     PHYSICAL EXAM:   Constitutional - NAD, Comfortable  HEENT - NCAT, EOMI  Neck - Supple, No limited ROM  Chest - good chest expansion, good respiratory effort, CTAB   Cardio - warm and well perfused, RRR, no murmur  Abdomen -  Soft, NTND  Extremities - + swelling bilateral knees, No calf tenderness   Neurologic Exam:                    Cognitive -             Orientation: Awake, Alert, AAO to self, place, date, year, situation            Attention at baseline,   Speech - Fluent       Motor -                      LEFT    UE - ShAB 5/5, EF 5/5, EE 5/5, WE 5/5,  WNL                    RIGHT UE - ShAB 5/5, EF 5/5, EE 5/5, WE 5/5,  WNL                    LEFT    LE - HF 5/5, KE not tested due to pain, DF 5/5, PF 5/5 > 10>100 ROM both knees                    RIGHT LE - HF 5/5, KE not tested due to pain, DF 5/5, PF 5/5        Psychiatric - Mood stable, Affect WNL  Skin on admission: BL surgical incision of the bilateral knees - dressing DCd- staples intact - incision dry        IDT meeting on 12/8  SW: APT 16STE    OT:  eating CS  grooming CS  UBD/LBD CS  toileting CS  tub/shower Cs  bathing    PT:  amb 80ft CG RW  transfers CG  stairs 8st min A    SLP na  tentative dc home c HC 12/15

## 2022-12-13 ENCOUNTER — TRANSCRIPTION ENCOUNTER (OUTPATIENT)
Age: 72
End: 2022-12-13

## 2022-12-13 PROCEDURE — 99232 SBSQ HOSP IP/OBS MODERATE 35: CPT

## 2022-12-13 PROCEDURE — 99232 SBSQ HOSP IP/OBS MODERATE 35: CPT | Mod: GC

## 2022-12-13 RX ORDER — FERROUS SULFATE 325(65) MG
1 TABLET ORAL
Qty: 0 | Refills: 0 | DISCHARGE
Start: 2022-12-13

## 2022-12-13 RX ORDER — LOSARTAN POTASSIUM 100 MG/1
1 TABLET, FILM COATED ORAL
Qty: 0 | Refills: 0 | DISCHARGE
Start: 2022-12-13

## 2022-12-13 RX ORDER — POLYETHYLENE GLYCOL 3350 17 G/17G
17 POWDER, FOR SOLUTION ORAL
Qty: 0 | Refills: 0 | DISCHARGE
Start: 2022-12-13

## 2022-12-13 RX ORDER — ASPIRIN/CALCIUM CARB/MAGNESIUM 324 MG
1 TABLET ORAL
Qty: 0 | Refills: 0 | DISCHARGE
Start: 2022-12-13

## 2022-12-13 RX ORDER — ACETAMINOPHEN 500 MG
3 TABLET ORAL
Qty: 0 | Refills: 0 | DISCHARGE
Start: 2022-12-13

## 2022-12-13 RX ORDER — ATORVASTATIN CALCIUM 80 MG/1
1 TABLET, FILM COATED ORAL
Qty: 0 | Refills: 0 | DISCHARGE
Start: 2022-12-13

## 2022-12-13 RX ORDER — FAMOTIDINE 10 MG/ML
1 INJECTION INTRAVENOUS
Qty: 0 | Refills: 0 | DISCHARGE
Start: 2022-12-13

## 2022-12-13 RX ORDER — OXYCODONE HYDROCHLORIDE 5 MG/1
1 TABLET ORAL
Qty: 0 | Refills: 0 | DISCHARGE
Start: 2022-12-13

## 2022-12-13 RX ADMIN — Medication 81 MILLIGRAM(S): at 17:05

## 2022-12-13 RX ADMIN — Medication 81 MILLIGRAM(S): at 05:22

## 2022-12-13 RX ADMIN — LOSARTAN POTASSIUM 25 MILLIGRAM(S): 100 TABLET, FILM COATED ORAL at 05:22

## 2022-12-13 RX ADMIN — FAMOTIDINE 20 MILLIGRAM(S): 10 INJECTION INTRAVENOUS at 05:22

## 2022-12-13 RX ADMIN — Medication 1 TABLET(S): at 11:38

## 2022-12-13 RX ADMIN — Medication 975 MILLIGRAM(S): at 12:30

## 2022-12-13 RX ADMIN — LIDOCAINE 2 PATCH: 4 CREAM TOPICAL at 17:05

## 2022-12-13 RX ADMIN — OXYCODONE HYDROCHLORIDE 5 MILLIGRAM(S): 5 TABLET ORAL at 02:04

## 2022-12-13 RX ADMIN — Medication 975 MILLIGRAM(S): at 11:38

## 2022-12-13 RX ADMIN — OXYCODONE HYDROCHLORIDE 5 MILLIGRAM(S): 5 TABLET ORAL at 07:55

## 2022-12-13 RX ADMIN — FAMOTIDINE 20 MILLIGRAM(S): 10 INJECTION INTRAVENOUS at 17:05

## 2022-12-13 RX ADMIN — SENNA PLUS 2 TABLET(S): 8.6 TABLET ORAL at 21:37

## 2022-12-13 RX ADMIN — Medication 325 MILLIGRAM(S): at 11:38

## 2022-12-13 RX ADMIN — OXYCODONE HYDROCHLORIDE 5 MILLIGRAM(S): 5 TABLET ORAL at 21:40

## 2022-12-13 RX ADMIN — OXYCODONE HYDROCHLORIDE 5 MILLIGRAM(S): 5 TABLET ORAL at 08:30

## 2022-12-13 RX ADMIN — OXYCODONE HYDROCHLORIDE 5 MILLIGRAM(S): 5 TABLET ORAL at 22:10

## 2022-12-13 RX ADMIN — ATORVASTATIN CALCIUM 40 MILLIGRAM(S): 80 TABLET, FILM COATED ORAL at 21:38

## 2022-12-13 NOTE — DISCHARGE NOTE PROVIDER - NSDCMRMEDTOKEN_GEN_ALL_CORE_FT
acetaminophen 325 mg oral tablet: 3 tab(s) orally every 8 hours, As needed, Temp greater or equal to 38C (100.4F), Mild Pain (1 - 3)  aspirin 81 mg oral tablet, chewable: 1 tab(s) orally every 12 hours  atorvastatin 40 mg oral tablet: 1 tab(s) orally once a day (at bedtime)  famotidine 20 mg oral tablet: 1 tab(s) orally 2 times a day  ferrous sulfate 325 mg (65 mg elemental iron) oral tablet: 1 tab(s) orally once a day  losartan 25 mg oral tablet: 1 tab(s) orally once a day  Multiple Vitamins oral tablet: 1 tab(s) orally once a day  oxyCODONE 5 mg oral tablet: 1 tab(s) orally every 6 hours, As needed, Moderate Pain (4 - 6)  polyethylene glycol 3350 oral powder for reconstitution: 17 gram(s) orally once a day, As needed, Constipation   acetaminophen 325 mg oral tablet: 3 tab(s) orally every 8 hours, As needed, Temp greater or equal to 38C (100.4F), Mild Pain (1 - 3)  aspirin 81 mg oral tablet, chewable: 1 tab(s) orally every 12 hours  atorvastatin 40 mg oral tablet: 1 tab(s) orally once a day (at bedtime)  famotidine 20 mg oral tablet: 1 tab(s) orally 2 times a day  ferrous sulfate 325 mg (65 mg elemental iron) oral tablet: 1 tab(s) orally once a day  losartan 25 mg oral tablet: 1 tab(s) orally once a day  Multiple Vitamins oral tablet: 1 tab(s) orally once a day  OPD Physical therapy 2-3x/week:   oxyCODONE 5 mg oral tablet: 1 tab(s) orally every 6 hours, As needed, Moderate Pain (4 - 6)  polyethylene glycol 3350 oral powder for reconstitution: 17 gram(s) orally once a day, As needed, Constipation   acetaminophen 325 mg oral tablet: 3 tab(s) orally every 8 hours, As needed, Temp greater or equal to 38C (100.4F), Mild Pain (1 - 3)  aspirin 81 mg oral tablet, chewable: 1 tab(s) orally every 12 hours  atorvastatin 40 mg oral tablet: 1 tab(s) orally once a day (at bedtime)  famotidine 20 mg oral tablet: 1 tab(s) orally 2 times a day  ferrous sulfate 325 mg (65 mg elemental iron) oral tablet: 1 tab(s) orally once a day  losartan 25 mg oral tablet: 1 tab(s) orally once a day  Multiple Vitamins oral tablet: 1 tab(s) orally once a day  oxyCODONE 5 mg oral tablet: 1 tab(s) orally every 6 hours, As needed, Moderate Pain (4 - 6) MDD:3 tabs  polyethylene glycol 3350 oral powder for reconstitution: 17 gram(s) orally once a day, As needed, Constipation

## 2022-12-13 NOTE — DISCHARGE NOTE PROVIDER - CARE PROVIDER_API CALL
Dick Morelos)  Orthopaedic Surgery  [address] [place] [address]  Phone: [phone]  Fax: [phone]  Follow Up Time:     Jose Angel Marrero  Internal Medicine  [address] [facility], [address]  [place] [address]  Phone: [phone]  Fax: [phone]  Follow Up Time:    Dick Morelos)  Orthopaedic Surgery  [address], [address] [place] [address]  Phone: [phone]  Fax: [phone]  Follow Up Time:     Jose Angel Marrero  Internal Medicine  [address] [facility] [address]  [place] [address]  Phone: [phone]  Fax: [phone]  Follow Up Time:     Aftab Corey)  PhysicalRehab Medicine  [address] [place] [address]  Phone: [phone]  Fax: [phone]  Follow Up Time: 1 month

## 2022-12-13 NOTE — PROGRESS NOTE ADULT - ASSESSMENT
72yM with a history of HTN, HLD, OA, who presented to [facility] on 11/30 with complaint of bilateral knee pain and OA who underwent bilateral total knee arthroplasty on 11/30/22. Hospital course supposedly uncomplicated.        Admitted to [facility] acute inpatient rehab on 12/2 for ADL, gait, and functional impairments.       #Functional deficits due to OA s/p Bilateral Total Knee Arthroplasty  -b/l TKA 11/30/22  -pain control  -DVT ppx with ASA BID for 6 weeks (till 1/11). US LEs r/o DVT for recent fever, LE edema. Apply ice. - DUPLEX DONE - NEG FOR DVT  - Comprehensive Multidisciplinary Rehab Program     3 hours a day, 5 days a week with PT/OT/SLP     P&O as needed    #HTN  -c/w home dose losartan.  - Encourage PO. BPs better     #HLD  c/w equivalent home dose statin (at home on on Crestor 10mg)    Pain Management:  - Tylenol PRN  -oxycodone PRN  -Ice PRN    GI/Bowel:  - Senna QHS, Miralax daily   - GI ppx: pepcid    /Bladder:   - Encourage timed voids every 4 hours while awake       Skin/Pressure Injury:  - Skin assessment on admission: intact  - Monitor Incisions: Bilateral knee surgical incision with aquacel to be removed 7 days post op (12/7)-done   - per ortho- staples to stay 3 weeks post op - will be removed on outpt follow-up with Dr Morelos.   Diet:   - Diet Consistency/Modifications: Reg w/ thins - DASH    DVT ppx:  - ASA BID      Restrictions/Precautions:  - Weight bearing status: WBAT  - Precautions: Falls    ---------------  Outpatient Follow-up:    Dr. Jose Angel Marrero  Internal Medicine  [address] [facility]  [address]  [place] [address]  [phone]    Dr. Dick Morelos  Orthopedic Surgery  [address] [facility]  [address]  [place] [address]  [phone]  Follow-up in 3 weeks    --------------

## 2022-12-13 NOTE — PROGRESS NOTE ADULT - ATTENDING COMMENTS
Rehab Attending- Patient seen and examined by me- Case discussed, above note reviewed by me with modifications made    doing well  moved bowels, voiding  less swelling/pain BLEs  staples to remain in till 12/21- needs ortho to Dc as OPD  to continue intensive rehab program
Rehab Attending- Patient seen and examined by me - Case discussed, above note reviewed by me with modifications made    no events over weekend  voiding , moving bowels  good ROM both knees  Will contact ortho Dr Morelos regarding DC yousuf before DC  Script for OPD PT to be provided to patient  labs reviewed by me- ceferino  to continue intensive rehab program

## 2022-12-13 NOTE — DISCHARGE NOTE PROVIDER - HOSPITAL COURSE
This is a 72 year old male patient with hx of HTN, HLD, Rotator Cuff Tear (s/p Bilateral Repairs: Left 05/29/2013; Right: 08/28/2019), DDD (s/p Lumbar Laminectomy 1986) and Osteoarthritis presented to [facility] 11/30 for bilateral total knee replacement. Patient notes over 3 year history of bilateral knee pains. Of note, he states that he's had previous meniscus surgeries on both knees. The pain is described as "dull" and rates it 8/10 in intensity, and worse with stairs and inclines as well as with prolonged standing and ambulation. Conservative care tried in the past included intake of NSAIDs, cortisone injections, rest and activity modification with minimal relief of pain. XR of bilateral knees demonstrated "bone on bone" articulation. Patient underwent bilateral TKA 11/30/22 with. Patient deemed appropriate for acute inpatient rehab. Cleared for d/c to [facility] acute inpatient rehab on 12/2/22.     Pt was stable upon rehab admission to  Inpatient Rehabilitation Facility. Admitted with gait instabilty, ADL, and functional impairments.     Rehab Course significant for episode of fever without infectious source. Fever resolved without intervention. No other issues.    All other medical co-morbidities were stable.     Pt tolerated course of inpatient PT/OT/SLP rehab with significant functional improvements and met rehab goals prior to discharge.    Pt was medically cleared on 12/14 for d/c to home.     Pt will follow up with the following: Tamy (ortho), PCP, PM&R.    This is a 72 year old male patient with hx of HTN, HLD, Rotator Cuff Tear (s/p Bilateral Repairs: Left 05/29/2013; Right: 08/28/2019), DDD (s/p Lumbar Laminectomy 1986) and Osteoarthritis presented to [facility] 11/30 for bilateral total knee replacement. Patient notes over 3 year history of bilateral knee pains. Of note, he states that he's had previous meniscus surgeries on both knees. The pain is described as "dull" and rates it 8/10 in intensity, and worse with stairs and inclines as well as with prolonged standing and ambulation. Conservative care tried in the past included intake of NSAIDs, cortisone injections, rest and activity modification with minimal relief of pain. XR of bilateral knees demonstrated "bone on bone" articulation. Patient underwent bilateral TKA 11/30/22 with. Patient deemed appropriate for acute inpatient rehab. Cleared for d/c to [facility] acute inpatient rehab on 12/2/22.     Pt was stable upon rehab admission to  Inpatient Rehabilitation Facility. Admitted with gait instabilty, ADL, and functional impairments.     Rehab Course significant for episode of fever without infectious source. Fever resolved without intervention. Otherwise uncomplicated stay.    All other medical co-morbidities were stable.     Pt tolerated course of inpatient PT/OT/SLP rehab with significant functional improvements and met rehab goals prior to discharge.    Pt was medically cleared on 12/14 for d/c to home.     Pt will follow up with the following: Tamy (ortho), PCP, PM&R.

## 2022-12-13 NOTE — PROGRESS NOTE ADULT - SUBJECTIVE AND OBJECTIVE BOX
Patient is a 72y old  Male who presents with a chief complaint of OA s/p B/L TKA (12 Dec 2022 15:00)    SUBJECTIVE/ROS: Patient seen and examined. No acute overnight events. He had BM yesterday. He notes feeling well. Denies diarrhea/constipation/dysuria. Pain is controlled with current regimen. No other complaints.       MEDICATIONS:  MEDICATIONS  (STANDING):  aspirin  chewable 81 milliGRAM(s) Oral every 12 hours  atorvastatin 40 milliGRAM(s) Oral at bedtime  famotidine    Tablet 20 milliGRAM(s) Oral two times a day  ferrous    sulfate 325 milliGRAM(s) Oral daily  lidocaine   4% Patch 2 Patch Transdermal <User Schedule>  losartan 25 milliGRAM(s) Oral daily  multivitamin 1 Tablet(s) Oral daily  senna 2 Tablet(s) Oral at bedtime    MEDICATIONS  (PRN):  acetaminophen     Tablet .. 975 milliGRAM(s) Oral every 8 hours PRN Temp greater or equal to 38C (100.4F), Mild Pain (1 - 3)  oxyCODONE    IR 10 milliGRAM(s) Oral every 6 hours PRN Severe Pain (7 - 10)  oxyCODONE    IR 5 milliGRAM(s) Oral every 6 hours PRN Moderate Pain (4 - 6)  polyethylene glycol 3350 17 Gram(s) Oral daily PRN Constipation      RECENT LABS:                        9.1    9.69  )-----------( 527      ( 12 Dec 2022 06:13 )             27.9     12-12    139  |  104  |  16  ----------------------------<  104<H>  4.5   |  30  |  1.03    Ca    9.0      12 Dec 2022 06:13    TPro  6.7  /  Alb  2.9<L>  /  TBili  0.7  /  DBili  x   /  AST  26  /  ALT  49<H>  /  AlkPhos  67  12-12    LIVER FUNCTIONS - ( 12 Dec 2022 06:13 )  Alb: 2.9 g/dL / Pro: 6.7 g/dL / ALK PHOS: 67 U/L / ALT: 49 U/L / AST: 26 U/L / GGT: x                 CAPILLARY BLOOD GLUCOSE          VITALS  72y  Vital Signs Last 24 Hrs  T(C): 36.7 (13 Dec 2022 10:21), Max: 36.9 (12 Dec 2022 20:12)  T(F): 98 (13 Dec 2022 10:21), Max: 98.5 (12 Dec 2022 20:12)  HR: 76 (13 Dec 2022 10:21) (74 - 78)  BP: 128/72 (13 Dec 2022 10:21) (127/65 - 128/72)  BP(mean): --  RR: 16 (13 Dec 2022 10:21) (15 - 18)  SpO2: 98% (13 Dec 2022 10:21) (98% - 98%)    Parameters below as of 13 Dec 2022 10:21  Patient On (Oxygen Delivery Method): room air      Daily     Daily     PHYSICAL EXAM:  Constitutional - NAD, Comfortable  HEENT - NCAT, EOMI  Neck - Supple, No limited ROM  Chest - good chest expansion, good respiratory effort, CTAB   Cardio - warm and well perfused, RRR, no murmur  Abdomen -  Soft, NTND  Extremities - + swelling bilateral knees, No calf tenderness   Neurologic Exam:                    Cognitive -             Orientation: Awake, Alert, AAO to self, place, date, year, situation            Attention at baseline,   Speech - Fluent       Motor -                      LEFT    UE - ShAB 5/5, EF 5/5, EE 5/5, WE 5/5,  WNL                    RIGHT UE - ShAB 5/5, EF 5/5, EE 5/5, WE 5/5,  WNL                    LEFT    LE - HF 5/5,KE 4/5 DF 5/5, PF 5/5 > 100 ROM both knees                    RIGHT LE - HF 5/5, KE 4/5 DF 5/5, PF 5/5        Psychiatric - Mood stable, Affect WNL  Skin on admission: BL surgical incision of the bilateral knees - dressing DCd- staples intact - incision dry    IDT meeting on 12/8  SW: APT 16STE    OT:  eating CS  grooming CS  UBD/LBD CS  toileting CS  tub/shower Cs  bathing    PT:  amb 80ft CG RW  transfers CG  stairs 8st min A    SLP na  tentative dc home  HC 12/15

## 2022-12-13 NOTE — DISCHARGE NOTE PROVIDER - DETAILS OF MALNUTRITION DIAGNOSIS/DIAGNOSES
This patient has been assessed with a concern for Malnutrition and was treated during this hospitalization for the following Nutrition diagnosis/diagnoses:     -  12/05/2022: Moderate protein-calorie malnutrition

## 2022-12-13 NOTE — DISCHARGE NOTE PROVIDER - NSDCFUADDAPPT_GEN_ALL_CORE_FT
FU Dr Morelos 3 weeks post surgery for removal of staples FU Dr Morelos 3 weeks post surgery for removal of staples  Continue Baby ASA 2x/day for six weeks post surgery as per your surgeon to prevent blood clots

## 2022-12-13 NOTE — PROGRESS NOTE ADULT - ASSESSMENT
72M with PMH HTN, HLD, osteoarthritis admitted to [facility] after hospital course at [facility] for bilateral total knee replacement.     #Osteoarthritis s/p bilateral TKR  -Continue comprehensive rehab program   -Continue pain control as per primary team  - fall precautions    # B/L LE edema  - Duplex neg for DVT  - elevate legs  - DVT-P    #HLD  -Continue Lipitor     #Anemia  Likely anemia of chronic disease with component of post op anemia  -H/H stable, no overt signs of bleeding  -Continue iron tabs  - monitor cbc periodically    #HTN  -Continue Losartan  -Monitor vitals  - DASh/TLC    # Mildly elevated ALT  - monitor    # Hypoalbuminemia  - nutrition on board    #Prophylactic Measure  -DVT ppx: ASA 81mg BID per ortho  -GI ppx: Pepcid   -Bowel regimen    d/w rehab team at [facility]

## 2022-12-13 NOTE — DISCHARGE NOTE PROVIDER - PROVIDER TOKENS
PROVIDER:[TOKEN:[[id number]]],PROVIDER:[TOKEN:[[id number]]] PROVIDER:[TOKEN:[[id number]]],PROVIDER:[TOKEN:[[id number]]],PROVIDER:[TOKEN:[[id number]],FOLLOWUP:[1 month]]

## 2022-12-13 NOTE — PROGRESS NOTE ADULT - SUBJECTIVE AND OBJECTIVE BOX
Medicine Progress Note    Patient is a 72y old  Male who presents with a chief complaint of OA s/p B/L TKA (13 Dec 2022 12:14)      SUBJECTIVE / OVERNIGHT EVENTS:  seen and examined  Chart reviewed  No overnight events  Limb weakness improving with therapy  BM+  No pain  No complaints    ADDITIONAL REVIEW OF SYSTEMS:  no fever/chills/CP/sob/palpitation/dizziness/ abd pain/nausea/vomiting/diarrhea/constipation/headaches. all other ROS neg    MEDICATIONS  (STANDING):  aspirin  chewable 81 milliGRAM(s) Oral every 12 hours  atorvastatin 40 milliGRAM(s) Oral at bedtime  famotidine    Tablet 20 milliGRAM(s) Oral two times a day  ferrous    sulfate 325 milliGRAM(s) Oral daily  lidocaine   4% Patch 2 Patch Transdermal <User Schedule>  losartan 25 milliGRAM(s) Oral daily  multivitamin 1 Tablet(s) Oral daily  senna 2 Tablet(s) Oral at bedtime    MEDICATIONS  (PRN):  acetaminophen     Tablet .. 975 milliGRAM(s) Oral every 8 hours PRN Temp greater or equal to 38C (100.4F), Mild Pain (1 - 3)  oxyCODONE    IR 10 milliGRAM(s) Oral every 6 hours PRN Severe Pain (7 - 10)  oxyCODONE    IR 5 milliGRAM(s) Oral every 6 hours PRN Moderate Pain (4 - 6)  polyethylene glycol 3350 17 Gram(s) Oral daily PRN Constipation    CAPILLARY BLOOD GLUCOSE        I&O's Summary      PHYSICAL EXAM:  Vital Signs Last 24 Hrs  T(C): 36.7 (13 Dec 2022 10:21), Max: 36.9 (12 Dec 2022 20:12)  T(F): 98 (13 Dec 2022 10:21), Max: 98.5 (12 Dec 2022 20:12)  HR: 76 (13 Dec 2022 10:21) (74 - 78)  BP: 128/72 (13 Dec 2022 10:21) (127/65 - 128/72)  BP(mean): --  RR: 16 (13 Dec 2022 10:21) (15 - 18)  SpO2: 98% (13 Dec 2022 10:21) (98% - 98%)    Parameters below as of 13 Dec 2022 10:21  Patient On (Oxygen Delivery Method): room air    GENERAL: Not in distress. Alert    HEENT: clear conjuctiva, MMM. no pallor or icterus  CARDIOVASCULAR: RRR S1, S2. No murmur/rubs/gallop  LUNGS: BLAE+, no rales, no wheezing, no rhonchi.    ABDOMEN: ND. Soft,  NT, no guarding / rebound / rigidity. BS normoactive  BACK: No spine tenderness.  EXTREMITIES: + edema. no leg or calf TP.  SKIN: warm and dry. b/l knee staples. wound c/d/i  PSYCHIATRIC: Calm.  No agitation.  CNS: AAO*3. moving limbs. hearing impairment+    LABS:                        9.1    9.69  )-----------( 527      ( 12 Dec 2022 06:13 )             27.9     12-12    139  |  104  |  16  ----------------------------<  104<H>  4.5   |  30  |  1.03    Ca    9.0      12 Dec 2022 06:13    TPro  6.7  /  Alb  2.9<L>  /  TBili  0.7  /  DBili  x   /  AST  26  /  ALT  49<H>  /  AlkPhos  67  12-12              COVID-19 PCR: NotDetec (04 Dec 2022 06:30)  COVID-19 PCR: NotDetec (03 Dec 2022 06:00)      RADIOLOGY & ADDITIONAL TESTS:  Imaging from Last 24 Hours:    Electrocardiogram/QTc Interval:    COORDINATION OF CARE:  Care Discussed with Consultants/Other Providers:

## 2022-12-13 NOTE — DISCHARGE NOTE PROVIDER - NSDCCPCAREPLAN_GEN_ALL_CORE_FT
PRINCIPAL DISCHARGE DIAGNOSIS  Diagnosis: Status post total knee replacement  Assessment and Plan of Treatment: You had total knee arthroplasty of both knees on 11/30. You were sent to acute inpatient rehab for functional retraining due to deficits after the replacement. You reached your functional goals and were cleard for discharge. You must follow-up with your orthopedic surgeon Dr Morelos on discharge and continue 81mg aspirin (baby aspirin) twice a day until your follow-up visit. Continue your other medications as prescribed.

## 2022-12-14 ENCOUNTER — TRANSCRIPTION ENCOUNTER (OUTPATIENT)
Age: 72
End: 2022-12-14

## 2022-12-14 PROCEDURE — 99232 SBSQ HOSP IP/OBS MODERATE 35: CPT | Mod: GC

## 2022-12-14 PROCEDURE — 99232 SBSQ HOSP IP/OBS MODERATE 35: CPT

## 2022-12-14 RX ADMIN — OXYCODONE HYDROCHLORIDE 10 MILLIGRAM(S): 5 TABLET ORAL at 11:36

## 2022-12-14 RX ADMIN — LOSARTAN POTASSIUM 25 MILLIGRAM(S): 100 TABLET, FILM COATED ORAL at 05:56

## 2022-12-14 RX ADMIN — FAMOTIDINE 20 MILLIGRAM(S): 10 INJECTION INTRAVENOUS at 17:11

## 2022-12-14 RX ADMIN — OXYCODONE HYDROCHLORIDE 5 MILLIGRAM(S): 5 TABLET ORAL at 23:10

## 2022-12-14 RX ADMIN — Medication 325 MILLIGRAM(S): at 11:37

## 2022-12-14 RX ADMIN — Medication 81 MILLIGRAM(S): at 17:11

## 2022-12-14 RX ADMIN — ATORVASTATIN CALCIUM 40 MILLIGRAM(S): 80 TABLET, FILM COATED ORAL at 22:42

## 2022-12-14 RX ADMIN — LIDOCAINE 2 PATCH: 4 CREAM TOPICAL at 05:40

## 2022-12-14 RX ADMIN — Medication 1 TABLET(S): at 11:37

## 2022-12-14 RX ADMIN — POLYETHYLENE GLYCOL 3350 17 GRAM(S): 17 POWDER, FOR SOLUTION ORAL at 22:42

## 2022-12-14 RX ADMIN — FAMOTIDINE 20 MILLIGRAM(S): 10 INJECTION INTRAVENOUS at 05:56

## 2022-12-14 RX ADMIN — SENNA PLUS 2 TABLET(S): 8.6 TABLET ORAL at 22:42

## 2022-12-14 RX ADMIN — Medication 81 MILLIGRAM(S): at 06:12

## 2022-12-14 RX ADMIN — OXYCODONE HYDROCHLORIDE 5 MILLIGRAM(S): 5 TABLET ORAL at 22:42

## 2022-12-14 NOTE — PROGRESS NOTE ADULT - ASSESSMENT
72M with PMH HTN, HLD, osteoarthritis admitted to [facility] after hospital course at [facility] for bilateral total knee replacement.     #Osteoarthritis s/p bilateral TKR  -Continue comprehensive rehab program   -Continue pain control with Lidocaine patch, Oxycodone PRN  -Follow up with ortho outpatient     #HLD  -Continue Lipitor     #Anemia  Likely anemia of chronic disease with component of post op anemia  -H/H stable, no overt signs of bleeding  -Continue iron tabs  -Follow up AM CBC    #HTN  -Continue Losartan  -Monitor vitals    #Prophylactic Measure  -DVT ppx: ASA 81mg BID per ortho  -GI ppx: Pepcid   -Bowel regimen

## 2022-12-14 NOTE — PROGRESS NOTE ADULT - ASSESSMENT
72yM with a history of HTN, HLD, OA, who presented to [facility] on 11/30 with complaint of bilateral knee pain and OA who underwent bilateral total knee arthroplasty on 11/30/22. Hospital course supposedly uncomplicated.        Admitted to [place] acute inpatient rehab on 12/2 for ADL, gait, and functional impairments.       #Functional deficits due to OA s/p Bilateral Total Knee Arthroplasty  -b/l TKA 11/30/22  -pain control  -DVT ppx with ASA BID for 6 weeks (till 1/11). US LEs r/o DVT for recent fever, LE edema. Apply ice. - DUPLEX DONE - NEG FOR DVT  - Comprehensive Multidisciplinary Rehab Program     3 hours a day, 5 days a week with PT/OT/SLP     P&O as needed  ANTICIPATE DC IN AM    #HTN  -c/w home dose losartan.  - Encourage PO. BPs better     #HLD  c/w equivalent home dose statin (at home on on Crestor 10mg)    Pain Management:  - Tylenol PRN  -oxycodone PRN  -Ice PRN    GI/Bowel:  - Senna QHS, Miralax daily   - GI ppx: pepcid    /Bladder:   - Encourage timed voids every 4 hours while awake       Skin/Pressure Injury:  - Skin assessment on admission: intact  - Monitor Incisions: Bilateral knee surgical incision with aquacel to be removed 7 days post op (12/7)-done   - per ortho- staples to stay 3 weeks post op - will be removed on outpt follow-up with Dr Morelos after   Diet:   - Diet Consistency/Modifications: Reg w/ thins - DASH    DVT ppx:  - ASA BID      Restrictions/Precautions:  - Weight bearing status: WBAT  - Precautions: Falls    ---------------  Outpatient Follow-up:    Dr. Jose Angel Marrero  Internal Medicine  [address]  [address]  [place] [address]  [phone]    Dr. Dick Morelos  Orthopedic Surgery  [address]  [address]  [place] [address]  [phone]  Follow-up in 3 weeks    --------------

## 2022-12-14 NOTE — DISCHARGE NOTE NURSING/CASE MANAGEMENT/SOCIAL WORK - NSDCCRTYPESERV_GEN_ALL_CORE_FT
Outpatient therapy: First appointment scheduled Monday 12/19/2022 at 12:00PM. Please bring prescriptions, insurance info and discharge papers

## 2022-12-14 NOTE — DISCHARGE NOTE NURSING/CASE MANAGEMENT/SOCIAL WORK - NSDCPEFALRISK_GEN_ALL_CORE
For information on Fall & Injury Prevention, visit: [email or web address] OR  [email or web address] OR  https://www.cdc.gov/steadi/patient.html

## 2022-12-14 NOTE — DISCHARGE NOTE NURSING/CASE MANAGEMENT/SOCIAL WORK - PATIENT PORTAL LINK FT
You can access the FollowMyHealth Patient Portal offered by [facility] by registering at the following website: [email or web address]. By joining [facility]’s FollowMyHealth portal, you will also be able to view your health information using other applications (apps) compatible with our system.

## 2022-12-14 NOTE — PROGRESS NOTE ADULT - SUBJECTIVE AND OBJECTIVE BOX
Patient is a 72y old  Male who presents with a chief complaint of OA s/p B/L TKA (13 Dec 2022 14:25)      Patient seen and examined at bedside. No overnight events.  DC planning per primary    REVIEW OF SYSTEMS:  CONSTITUTIONAL: No fever or chills  CARDIOVASCULAR: No chest pain, palpitations    ALLERGIES:  No Known Allergies    MEDICATIONS  (STANDING):  aspirin  chewable 81 milliGRAM(s) Oral every 12 hours  atorvastatin 40 milliGRAM(s) Oral at bedtime  famotidine    Tablet 20 milliGRAM(s) Oral two times a day  ferrous    sulfate 325 milliGRAM(s) Oral daily  lidocaine   4% Patch 2 Patch Transdermal <User Schedule>  losartan 25 milliGRAM(s) Oral daily  multivitamin 1 Tablet(s) Oral daily  senna 2 Tablet(s) Oral at bedtime    MEDICATIONS  (PRN):  acetaminophen     Tablet .. 975 milliGRAM(s) Oral every 8 hours PRN Temp greater or equal to 38C (100.4F), Mild Pain (1 - 3)  oxyCODONE    IR 10 milliGRAM(s) Oral every 6 hours PRN Severe Pain (7 - 10)  oxyCODONE    IR 5 milliGRAM(s) Oral every 6 hours PRN Moderate Pain (4 - 6)  polyethylene glycol 3350 17 Gram(s) Oral daily PRN Constipation    Vital Signs Last 24 Hrs  T(F): 98 (14 Dec 2022 07:55), Max: 98.7 (13 Dec 2022 21:29)  HR: 94 (14 Dec 2022 07:55) (71 - 94)  BP: 105/60 (14 Dec 2022 07:55) (105/60 - 128/72)  RR: 16 (14 Dec 2022 07:55) (16 - 16)  SpO2: 98% (14 Dec 2022 07:55) (98% - 98%)  I&O's Summary        PHYSICAL EXAM:  GENERAL: NAD  HEENT:  AT/NC, anicteric, moist mucous membranes, EOMI, PERRL, no lid-lag, conjunctiva and sclera clear  CHEST/LUNG:  CTA b/l, no rales, wheezes, or rhonchi,  normal respiratory effort, no intercostal retractions  HEART:  RRR, S1, S2, no murmurs; no pitting edema  ABDOMEN:  BS+, soft, nontender, nondistended  MSK/EXTREMITIES: palpable peripheral pulses, no clubbing or cyanosis; bilateral knee staples c/d/i without surround erythema incision healed  NERVOUS SYSTEM: answers questions and follows commands appropriately A&Ox3 grossly moves all extremities   PSYCH: Appropriate affect, Alert & Awake; Good judgement    LABS: Personally reviewed                        9.1    9.69  )-----------( 527      ( 12 Dec 2022 06:13 )             27.9       12-12    139  |  104  |  16  ----------------------------<  104  4.5   |  30  |  1.03    Ca    9.0      12 Dec 2022 06:13    TPro  6.7  /  Alb  2.9  /  TBili  0.7  /  DBili  x   /  AST  26  /  ALT  49  /  AlkPhos  67  12-12           COVID-19 PCR: NotDetec (12-04-22 @ 06:30)  COVID-19 PCR: NotDetec (12-03-22 @ 06:00)      RADIOLOGY & ADDITIONAL TESTS: Personally reviewed    Medical management discussed with Dr. Corey (physiatry),medically stable for dc home with close outpatient follow up

## 2022-12-14 NOTE — DISCHARGE NOTE NURSING/CASE MANAGEMENT/SOCIAL WORK - NSDCFUADDAPPT_GEN_ALL_CORE_FT
Outpatient Therapy:  [facility]  [address] [place] [address]  Date: Monday 12/19/2022  Time: 12:00 PM

## 2022-12-14 NOTE — PROGRESS NOTE ADULT - SUBJECTIVE AND OBJECTIVE BOX
Patient is a 72y old  Male who presents with a chief complaint of OA s/p B/L TKA (12 Dec 2022 15:00)    SUBJECTIVE/ROS: Patient seen and examined bedside  No acute overnight events.    Denies diarrhea/constipation/dysuria   Pain is controlled with current regimen.   ready for Discharge in AM  given prescription for OPD PT By Me      MEDICATIONS  (STANDING):  aspirin  chewable 81 milliGRAM(s) Oral every 12 hours  atorvastatin 40 milliGRAM(s) Oral at bedtime  famotidine    Tablet 20 milliGRAM(s) Oral two times a day  ferrous    sulfate 325 milliGRAM(s) Oral daily  lidocaine   4% Patch 2 Patch Transdermal <User Schedule>  losartan 25 milliGRAM(s) Oral daily  multivitamin 1 Tablet(s) Oral daily  senna 2 Tablet(s) Oral at bedtime    MEDICATIONS  (PRN):  acetaminophen     Tablet .. 975 milliGRAM(s) Oral every 8 hours PRN Temp greater or equal to 38C (100.4F), Mild Pain (1 - 3)  oxyCODONE    IR 10 milliGRAM(s) Oral every 6 hours PRN Severe Pain (7 - 10)  oxyCODONE    IR 5 milliGRAM(s) Oral every 6 hours PRN Moderate Pain (4 - 6)  polyethylene glycol 3350 17 Gram(s) Oral daily PRN Constipation        RECENT LABS:                        9.1    9.69  )-----------( 527      ( 12 Dec 2022 06:13 )             27.9     12-12    139  |  104  |  16  ----------------------------<  104<H>  4.5   |  30  |  1.03    Ca    9.0      12 Dec 2022 06:13    TPro  6.7  /  Alb  2.9<L>  /  TBili  0.7  /  DBili  x   /  AST  26  /  ALT  49<H>  /  AlkPhos  67  12-12    LIVER FUNCTIONS - ( 12 Dec 2022 06:13 )  Alb: 2.9 g/dL / Pro: 6.7 g/dL / ALK PHOS: 67 U/L / ALT: 49 U/L / AST: 26 U/L / GGT: x                 CAPILLARY BLOOD GLUCOSE          Vital Signs Last 24 Hrs  T(C): 36.7 (14 Dec 2022 07:55), Max: 37.1 (13 Dec 2022 21:29)  T(F): 98 (14 Dec 2022 07:55), Max: 98.7 (13 Dec 2022 21:29)  HR: 94 (14 Dec 2022 07:55) (71 - 94)  BP: 105/60 (14 Dec 2022 07:55) (105/60 - 123/71)  BP(mean): --  RR: 16 (14 Dec 2022 07:55) (16 - 16)  SpO2: 98% (14 Dec 2022 07:55) (98% - 98%)    Parameters below as of 14 Dec 2022 07:55  Patient On (Oxygen Delivery Method): room air        PHYSICAL EXAM:  Constitutional - NAD, Comfortable  HEENT - NCAT, EOMI  Neck - Supple, No limited ROM  Chest - good chest expansion, good respiratory effort, CTAB   Cardio - warm and well perfused, RRR, no murmur  Abdomen -  Soft, NTND  Extremities - + swelling bilateral knees, No calf tenderness   Neurologic Exam:                    Cognitive -             Orientation: Awake, Alert, AAO to self, place, date, year, situation            Attention at baseline,   Speech - Fluent       Motor -                      LEFT    UE - ShAB 5/5, EF 5/5, EE 5/5, WE 5/5,  WNL                    RIGHT UE - ShAB 5/5, EF 5/5, EE 5/5, WE 5/5,  WNL                    LEFT    LE - HF 5/5,KE 4/5 DF 5/5, PF 5/5 > 100 ROM both knees                    RIGHT LE - HF 5/5, KE 4/5 DF 5/5, PF 5/5        Psychiatric - Mood stable, Affect WNL  Skin on admission: BL surgical incision of the bilateral knees - dressing DCd- staples intact - incision dry    IDT meeting on 12/8  SW: APT 16STE    OT:  eating CS  grooming CS  UBD/LBD CS  toileting CS  tub/shower Cs  bathing    PT:  amb 80ft CG RW  transfers CG  stairs 8st min A    SLP na  tentative dc home  HC 12/15

## 2022-12-15 VITALS
HEART RATE: 74 BPM | DIASTOLIC BLOOD PRESSURE: 74 MMHG | RESPIRATION RATE: 16 BRPM | TEMPERATURE: 98 F | SYSTOLIC BLOOD PRESSURE: 118 MMHG | OXYGEN SATURATION: 99 %

## 2022-12-15 LAB
ALBUMIN SERPL ELPH-MCNC: 3.4 G/DL — SIGNIFICANT CHANGE UP (ref 3.3–5)
ALP SERPL-CCNC: 88 U/L — SIGNIFICANT CHANGE UP (ref 40–120)
ALT FLD-CCNC: 31 U/L — SIGNIFICANT CHANGE UP (ref 10–45)
ANION GAP SERPL CALC-SCNC: 8 MMOL/L — SIGNIFICANT CHANGE UP (ref 5–17)
AST SERPL-CCNC: 13 U/L — SIGNIFICANT CHANGE UP (ref 10–40)
BASOPHILS # BLD AUTO: 0.04 K/UL — SIGNIFICANT CHANGE UP (ref 0–0.2)
BASOPHILS NFR BLD AUTO: 0.4 % — SIGNIFICANT CHANGE UP (ref 0–2)
BILIRUB SERPL-MCNC: 0.8 MG/DL — SIGNIFICANT CHANGE UP (ref 0.2–1.2)
BUN SERPL-MCNC: 15 MG/DL — SIGNIFICANT CHANGE UP (ref 7–23)
CALCIUM SERPL-MCNC: 8.9 MG/DL — SIGNIFICANT CHANGE UP (ref 8.4–10.5)
CHLORIDE SERPL-SCNC: 103 MMOL/L — SIGNIFICANT CHANGE UP (ref 96–108)
CO2 SERPL-SCNC: 28 MMOL/L — SIGNIFICANT CHANGE UP (ref 22–31)
CREAT SERPL-MCNC: 1.03 MG/DL — SIGNIFICANT CHANGE UP (ref 0.5–1.3)
EGFR: 78 ML/MIN/1.73M2 — SIGNIFICANT CHANGE UP
EOSINOPHIL # BLD AUTO: 0.1 K/UL — SIGNIFICANT CHANGE UP (ref 0–0.5)
EOSINOPHIL NFR BLD AUTO: 1.1 % — SIGNIFICANT CHANGE UP (ref 0–6)
GLUCOSE SERPL-MCNC: 115 MG/DL — HIGH (ref 70–99)
HCT VFR BLD CALC: 31.1 % — LOW (ref 39–50)
HGB BLD-MCNC: 10.2 G/DL — LOW (ref 13–17)
IMM GRANULOCYTES NFR BLD AUTO: 0.9 % — SIGNIFICANT CHANGE UP (ref 0–0.9)
LYMPHOCYTES # BLD AUTO: 2.37 K/UL — SIGNIFICANT CHANGE UP (ref 1–3.3)
LYMPHOCYTES # BLD AUTO: 25.2 % — SIGNIFICANT CHANGE UP (ref 13–44)
MCHC RBC-ENTMCNC: 30 PG — SIGNIFICANT CHANGE UP (ref 27–34)
MCHC RBC-ENTMCNC: 32.8 GM/DL — SIGNIFICANT CHANGE UP (ref 32–36)
MCV RBC AUTO: 91.5 FL — SIGNIFICANT CHANGE UP (ref 80–100)
MONOCYTES # BLD AUTO: 0.76 K/UL — SIGNIFICANT CHANGE UP (ref 0–0.9)
MONOCYTES NFR BLD AUTO: 8.1 % — SIGNIFICANT CHANGE UP (ref 2–14)
NEUTROPHILS # BLD AUTO: 6.04 K/UL — SIGNIFICANT CHANGE UP (ref 1.8–7.4)
NEUTROPHILS NFR BLD AUTO: 64.3 % — SIGNIFICANT CHANGE UP (ref 43–77)
NRBC # BLD: 0 /100 WBCS — SIGNIFICANT CHANGE UP (ref 0–0)
PLATELET # BLD AUTO: 583 K/UL — HIGH (ref 150–400)
POTASSIUM SERPL-MCNC: 4 MMOL/L — SIGNIFICANT CHANGE UP (ref 3.5–5.3)
POTASSIUM SERPL-SCNC: 4 MMOL/L — SIGNIFICANT CHANGE UP (ref 3.5–5.3)
PROT SERPL-MCNC: 7.3 G/DL — SIGNIFICANT CHANGE UP (ref 6–8.3)
RBC # BLD: 3.4 M/UL — LOW (ref 4.2–5.8)
RBC # FLD: 13.9 % — SIGNIFICANT CHANGE UP (ref 10.3–14.5)
SODIUM SERPL-SCNC: 139 MMOL/L — SIGNIFICANT CHANGE UP (ref 135–145)
WBC # BLD: 9.39 K/UL — SIGNIFICANT CHANGE UP (ref 3.8–10.5)
WBC # FLD AUTO: 9.39 K/UL — SIGNIFICANT CHANGE UP (ref 3.8–10.5)

## 2022-12-15 PROCEDURE — 99238 HOSP IP/OBS DSCHRG MGMT 30/<: CPT | Mod: GC

## 2022-12-15 PROCEDURE — 99232 SBSQ HOSP IP/OBS MODERATE 35: CPT

## 2022-12-15 RX ORDER — OXYCODONE HYDROCHLORIDE 5 MG/1
1 TABLET ORAL
Qty: 21 | Refills: 0
Start: 2022-12-15

## 2022-12-15 RX ADMIN — FAMOTIDINE 20 MILLIGRAM(S): 10 INJECTION INTRAVENOUS at 05:06

## 2022-12-15 RX ADMIN — Medication 81 MILLIGRAM(S): at 05:06

## 2022-12-15 NOTE — PROGRESS NOTE ADULT - SUBJECTIVE AND OBJECTIVE BOX
Patient is a 72y old  Male who presents with a chief complaint of OA s/p B/L TKA (15 Dec 2022 07:48)      Patient seen and examined at bedside. No overnight events.  Excited to be going home today.     REVIEW OF SYSTEMS:  CONSTITUTIONAL: No fever or chills  CARDIOVASCULAR: No chest pain, palpitations    ALLERGIES:  No Known Allergies    MEDICATIONS  (STANDING):  aspirin  chewable 81 milliGRAM(s) Oral every 12 hours  atorvastatin 40 milliGRAM(s) Oral at bedtime  famotidine    Tablet 20 milliGRAM(s) Oral two times a day  ferrous    sulfate 325 milliGRAM(s) Oral daily  lidocaine   4% Patch 2 Patch Transdermal <User Schedule>  losartan 25 milliGRAM(s) Oral daily  multivitamin 1 Tablet(s) Oral daily  senna 2 Tablet(s) Oral at bedtime    MEDICATIONS  (PRN):  acetaminophen     Tablet .. 975 milliGRAM(s) Oral every 8 hours PRN Temp greater or equal to 38C (100.4F), Mild Pain (1 - 3)  oxyCODONE    IR 10 milliGRAM(s) Oral every 6 hours PRN Severe Pain (7 - 10)  oxyCODONE    IR 5 milliGRAM(s) Oral every 6 hours PRN Moderate Pain (4 - 6)  polyethylene glycol 3350 17 Gram(s) Oral daily PRN Constipation    Vital Signs Last 24 Hrs  T(F): 98.1 (15 Dec 2022 08:05), Max: 98.9 (14 Dec 2022 22:35)  HR: 74 (15 Dec 2022 08:05) (74 - 85)  BP: 118/74 (15 Dec 2022 08:05) (111/70 - 118/74)  RR: 16 (15 Dec 2022 08:05) (16 - 16)  SpO2: 99% (15 Dec 2022 08:05) (98% - 99%)  I&O's Summary        PHYSICAL EXAM:  GENERAL: NAD  HEENT:  AT/NC, anicteric, moist mucous membranes, EOMI, PERRL, no lid-lag, conjunctiva and sclera clear  CHEST/LUNG:  CTA b/l, no rales, wheezes, or rhonchi,  normal respiratory effort, no intercostal retractions  HEART:  RRR, S1, S2, no murmurs; no pitting edema  ABDOMEN:  BS+, soft, nontender, nondistended  MSK/EXTREMITIES: palpable peripheral pulses, no clubbing or cyanosis; bilateral knee staples c/d/i without surround erythema incision healed  NERVOUS SYSTEM: answers questions and follows commands appropriately A&Ox3 grossly moves all extremities   PSYCH: Appropriate affect, Alert & Awake; Good judgement    LABS: Personally reviewed                        10.2   9.39  )-----------( 583      ( 15 Dec 2022 06:30 )             31.1       12-15    139  |  103  |  15  ----------------------------<  115  4.0   |  28  |  1.03    Ca    8.9      15 Dec 2022 06:30    TPro  7.3  /  Alb  3.4  /  TBili  0.8  /  DBili  x   /  AST  13  /  ALT  31  /  AlkPhos  88  12-15              COVID-19 PCR: NotDetec (12-04-22 @ 06:30)  COVID-19 PCR: NotDetec (12-03-22 @ 06:00)      RADIOLOGY & ADDITIONAL TESTS: Personally reviewed    Medical management discussed with Dr. Corey (physiatry), continue Losartan for HTN. Follow up with ortho outpatient closely for staple removal and continued management

## 2022-12-15 NOTE — PROGRESS NOTE ADULT - ASSESSMENT
72yM with a history of HTN, HLD, OA, who presented to [facility] on 11/30 with complaint of bilateral knee pain and OA who underwent bilateral total knee arthroplasty on 11/30/22. Hospital course supposedly uncomplicated.        Admitted to [facility] acute inpatient rehab on 12/2 for ADL, gait, and functional impairments.       #Functional deficits due to OA s/p Bilateral Total Knee Arthroplasty  -b/l TKA 11/30/22  -pain control  -DVT ppx with ASA BID for 6 weeks (till 1/11). US LEs r/o DVT for recent fever, LE edema. Apply ice. - DUPLEX DONE - NEG FOR DVT  - Comprehensive Multidisciplinary Rehab Program     3 hours a day, 5 days a week with PT/OT/SLP     P&O as needed  DC to home with OPD PT  FU Ortho Dr Morelos Next week to DC staples      #HTN  -c/w home dose losartan.  - Encourage PO. BPs better     #HLD  c/w equivalent home dose statin (at home on on Crestor 10mg)    Pain Management:  - Tylenol PRN  -oxycodone PRN  -Ice PRN    GI/Bowel:  - Senna QHS, Miralax daily   - GI ppx: pepcid    /Bladder:   - Encourage timed voids every 4 hours while awake       Skin/Pressure Injury:  - Skin assessment on admission: intact  - Monitor Incisions: Bilateral knee surgical incision with aquacel to be removed 7 days post op (12/7)-done   - per ortho- staples to stay 3 weeks post op - will be removed on outpt follow-up with Dr Morelos after DC  Diet:   - Diet Consistency/Modifications: Reg w/ thins - DASH    DVT ppx:  - ASA BID      Restrictions/Precautions:  - Weight bearing status: WBAT  - Precautions: Falls    ---------------  Outpatient Follow-up:    Dr. Jose Angel Marrero  Internal Medicine  [address]  [address]  [place] [address]  [phone]    Dr. Dick Morelos  Orthopedic Surgery  [address]  [address]  [place] [address]  [phone]  Follow-up in 3 weeks    --------------

## 2022-12-15 NOTE — PROGRESS NOTE ADULT - SUBJECTIVE AND OBJECTIVE BOX
Patient is a 72y old  Male who presents with a chief complaint of OA s/p B/L TKA (12 Dec 2022 15:00)    SUBJECTIVE/ROS: Patient seen and examined bedside  For Dc home  No acute overnight events.    Denies diarrhea/constipation/dysuria   Pain is controlled with current regimen.   has prescription for OPD PT    MEDICATIONS  (STANDING):  aspirin  chewable 81 milliGRAM(s) Oral every 12 hours  atorvastatin 40 milliGRAM(s) Oral at bedtime  famotidine    Tablet 20 milliGRAM(s) Oral two times a day  ferrous    sulfate 325 milliGRAM(s) Oral daily  lidocaine   4% Patch 2 Patch Transdermal <User Schedule>  losartan 25 milliGRAM(s) Oral daily  multivitamin 1 Tablet(s) Oral daily  senna 2 Tablet(s) Oral at bedtime    MEDICATIONS  (PRN):  acetaminophen     Tablet .. 975 milliGRAM(s) Oral every 8 hours PRN Temp greater or equal to 38C (100.4F), Mild Pain (1 - 3)  oxyCODONE    IR 10 milliGRAM(s) Oral every 6 hours PRN Severe Pain (7 - 10)  oxyCODONE    IR 5 milliGRAM(s) Oral every 6 hours PRN Moderate Pain (4 - 6)  polyethylene glycol 3350 17 Gram(s) Oral daily PRN Constipation                            10.2   9.39  )-----------( 583      ( 15 Dec 2022 06:30 )             31.1     12-15    139  |  103  |  15  ----------------------------<  115<H>  4.0   |  28  |  1.03    Ca    8.9      15 Dec 2022 06:30    TPro  7.3  /  Alb  3.4  /  TBili  0.8  /  DBili  x   /  AST  13  /  ALT  31  /  AlkPhos  88  12-15        CAPILLARY BLOOD GLUCOSE          Vital Signs Last 24 Hrs  T(C): 36.7 (15 Dec 2022 08:05), Max: 37.2 (14 Dec 2022 22:35)  T(F): 98.1 (15 Dec 2022 08:05), Max: 98.9 (14 Dec 2022 22:35)  HR: 74 (15 Dec 2022 08:05) (74 - 85)  BP: 118/74 (15 Dec 2022 08:05) (111/70 - 118/74)  BP(mean): --  RR: 16 (15 Dec 2022 08:05) (16 - 16)  SpO2: 99% (15 Dec 2022 08:05) (98% - 99%)    Parameters below as of 15 Dec 2022 08:05  Patient On (Oxygen Delivery Method): room air        PHYSICAL EXAM:  Constitutional - NAD, Comfortable  HEENT - NCAT, EOMI  Neck - Supple, No limited ROM  Chest - good chest expansion, good respiratory effort, CTAB   Cardio - warm and well perfused, RRR, no murmur  Abdomen -  Soft, NTND  Extremities - + swelling bilateral knees, No calf tenderness   Neurologic Exam:                    Cognitive -             Orientation: Awake, Alert, AAO to self, place, date, year, situation            Attention at baseline,   Speech - Fluent       Motor -                      LEFT    UE - ShAB 5/5, EF 5/5, EE 5/5, WE 5/5,  WNL                    RIGHT UE - ShAB 5/5, EF 5/5, EE 5/5, WE 5/5,  WNL                    LEFT    LE - HF 5/5,KE 4/5 DF 5/5, PF 5/5 > 100 ROM both knees                    RIGHT LE - HF 5/5, KE 4/5 DF 5/5, PF 5/5        Psychiatric - Mood stable, Affect WNL  Skin on admission: BL surgical incision of the bilateral knees - dressing DCd- staples intact - incision dry    IDT meeting on 12/8  SW: APT 16STE    OT:  eating CS  grooming CS  UBD/LBD CS  toileting CS  tub/shower Cs  bathing    PT:  amb 80ft CG RW  transfers CG  stairs 8st min A    SLP na  tentative dc home  HC 12/15

## 2022-12-15 NOTE — PROGRESS NOTE ADULT - NUTRITIONAL ASSESSMENT
This patient has been assessed with a concern for Malnutrition and has been determined to have a diagnosis/diagnoses of Moderate protein-calorie malnutrition.    This patient is being managed with:   Diet Regular-  Entered: Dec  5 2022  9:48AM    
respirations labored/diminished breath sounds, L/no rhonchi/no wheezes/diminished breath sounds, R/breath sounds equal/no rales/airway patent

## 2022-12-15 NOTE — PROGRESS NOTE ADULT - REASON FOR ADMISSION
OA s/p B/L TKA

## 2022-12-15 NOTE — PROGRESS NOTE ADULT - PROVIDER SPECIALTY LIST ADULT
Hospitalist
Rehab Medicine
Hospitalist
Hospitalist
Physiatry
Rehab Medicine
Hospitalist
Physiatry
Hospitalist
Hospitalist
Physiatry

## 2022-12-15 NOTE — PROGRESS NOTE ADULT - ASSESSMENT
72M with PMH HTN, HLD, osteoarthritis admitted to [facility] after hospital course at [facility] for bilateral total knee replacement.     #Osteoarthritis s/p bilateral TKR  -Continue comprehensive rehab program   -Continue pain control with Lidocaine patch, Oxycodone PRN  -Follow up with ortho outpatient     #HLD  -Continue Lipitor     #Anemia  Likely anemia of chronic disease with component of post op anemia  -H/H stable, no overt signs of bleeding  -Continue iron tabs  -Follow up AM CBC    #HTN  -Continue Losartan  -Monitor vitals    #Prophylactic Measure  -DVT ppx: ASA 81mg BID per ortho  -GI ppx: Pepcid   -Bowel regimen      Medically stable for dc home with close outpatient follow up

## 2023-01-11 PROCEDURE — 87635 SARS-COV-2 COVID-19 AMP PRB: CPT

## 2023-01-11 PROCEDURE — 84588 ASSAY OF VASOPRESSIN: CPT

## 2023-01-11 PROCEDURE — 80053 COMPREHEN METABOLIC PANEL: CPT

## 2023-01-11 PROCEDURE — 97530 THERAPEUTIC ACTIVITIES: CPT

## 2023-01-11 PROCEDURE — 84146 ASSAY OF PROLACTIN: CPT

## 2023-01-11 PROCEDURE — 97535 SELF CARE MNGMENT TRAINING: CPT

## 2023-01-11 PROCEDURE — 36415 COLL VENOUS BLD VENIPUNCTURE: CPT

## 2023-01-11 PROCEDURE — 97167 OT EVAL HIGH COMPLEX 60 MIN: CPT

## 2023-01-11 PROCEDURE — 97124 MASSAGE THERAPY: CPT

## 2023-01-11 PROCEDURE — 86803 HEPATITIS C AB TEST: CPT

## 2023-01-11 PROCEDURE — U0003: CPT

## 2023-01-11 PROCEDURE — 71045 X-RAY EXAM CHEST 1 VIEW: CPT

## 2023-01-11 PROCEDURE — 97110 THERAPEUTIC EXERCISES: CPT

## 2023-01-11 PROCEDURE — 93970 EXTREMITY STUDY: CPT

## 2023-01-11 PROCEDURE — 83605 ASSAY OF LACTIC ACID: CPT

## 2023-01-11 PROCEDURE — 87040 BLOOD CULTURE FOR BACTERIA: CPT

## 2023-01-11 PROCEDURE — 97163 PT EVAL HIGH COMPLEX 45 MIN: CPT

## 2023-01-11 PROCEDURE — U0005: CPT

## 2023-01-11 PROCEDURE — 97116 GAIT TRAINING THERAPY: CPT

## 2023-01-11 PROCEDURE — 85025 COMPLETE CBC W/AUTO DIFF WBC: CPT

## 2023-01-11 PROCEDURE — 80048 BASIC METABOLIC PNL TOTAL CA: CPT

## 2023-01-11 PROCEDURE — 81001 URINALYSIS AUTO W/SCOPE: CPT

## 2023-11-22 NOTE — CHART NOTE - NSCHARTNOTEFT_GEN_A_CORE
[facility] Plan of Care    REHABILITATION DIAGNOSIS:  Presence of both artificial knee joints          COMORBIDITIES/COMPLICATING CONDITIONS IMPACTING REHABILITATION:  HEALTH ISSUES - PROBLEM Dx:  Post op pain        PAST MEDICAL & SURGICAL HISTORY:  HTN (hypertension)      HLD (hyperlipidemia)      Osteoarthritis      History of lumbar laminectomy  1986      H/O repair of rotator cuff  left 2013, right 2019      History of meniscectomy of left knee      H/O meniscectomy of right knee          Based upon consideration of the patient's impairments, functional status, complicating conditions and any other contributing factors and after information garnered from the assessments of all therapy disciplines involved in treating the patient and other pertinent clinicians:    INTERDISCIPLINARY REHABILITATION INTERVENTIONS:    [ X  ] Transfer Training  [ X  ] Bed Mobility  [ X  ] Therapeutic Exercise  [ X ] Balance/Coordination Exercises  [ X ] Locomotion retraining  [ X  ] Stairs  [  X ] Functional Transfer Training  [ x  ] Bowel/Bladder program  [  x ] Pain Management  [ x  ] Skin/Wound Care  [   ] Visual/Perceptual Training  [   ] Therapeutic Recreation Activities  [   ] Neuromuscular Re-education  [ X  ] Activities of Daily Living  [   ] Speech Exercise  [   ] Swallowing Exercises  [   ] Vital Stim  [   ] Dietary Supplements  [   ] Calorie Count  [   ] Cognitive Exercises  [   ] Congnitive/Linguistic Treatment  [   ] Behavior Program  [   ] Neuropsych Therapy  [ X  ] Patient/Family Counseling  [ X ] Family Training  [ X  ] Community Re-entry  [   ] Orthotic Evaluation  [   ] Prosthetic Eval/Training    MEDICAL PROGNOSIS:  good    REHAB POTENTIAL:  good  EXPECTED DAILY THERAPY:         PT:2hr       OT:1hr       ST:       P&O:    EXPECTED INTENSITY OF PROGRAM:  3 hrs / Day    EXPECTED FREQUENCY OF PROGRAM: 5 Days/ Week    ESTIMATED LOS:  [  ] 5-7 Days  [  ] 7-10 Days  [ x ] 10- 14 Days  [  ] 14- 18 Days  [  ] 18- 21 Days    ESTIMATED DISPOSITION:  [  ] Home   [ x ] Home with Outpatient Therapies  [  ] Home with Home Therapies  [  ] Assisted Living  [  ] Nursing Home  [  ] Long Term Acute Care    INTERDISCIPLINARY FUNCTIONAL OUTCOMES/GOALS:         Gait/Mobility:6       Transfers:6       ADLs:6       Functional Transfers:6       Medication Management:7       Communication:NA       Cognitive:NA       Dysphagia:NA       Bladder7       Bowel:7     Functional Independent Measures:   7 = Independent  6 = Modified Independent  5 = Supervision  4 = Minimal Assist/ Contact Guard  3 = Moderate Assistance  2 = Maximum Assistance  1 = Total Assistance  0 = Unable to assess 77.1